# Patient Record
Sex: FEMALE | Race: WHITE
[De-identification: names, ages, dates, MRNs, and addresses within clinical notes are randomized per-mention and may not be internally consistent; named-entity substitution may affect disease eponyms.]

---

## 2017-03-14 ENCOUNTER — HOSPITAL ENCOUNTER (OUTPATIENT)
Age: 62
Discharge: HOME | End: 2017-03-14
Payer: MEDICAID

## 2017-03-14 DIAGNOSIS — F03.91: ICD-10-CM

## 2017-03-14 DIAGNOSIS — Z74.01: ICD-10-CM

## 2017-03-14 DIAGNOSIS — Z66: ICD-10-CM

## 2017-03-14 DIAGNOSIS — Z51.5: ICD-10-CM

## 2017-03-14 DIAGNOSIS — G35: Primary | ICD-10-CM

## 2017-08-15 ENCOUNTER — HOSPITAL ENCOUNTER (OUTPATIENT)
Dept: HOSPITAL 76 - PC | Age: 62
Discharge: HOME | End: 2017-08-15
Attending: NURSE PRACTITIONER
Payer: MEDICAID

## 2017-08-15 DIAGNOSIS — Z74.01: ICD-10-CM

## 2017-08-15 DIAGNOSIS — G35: ICD-10-CM

## 2017-08-15 DIAGNOSIS — F32.9: ICD-10-CM

## 2017-08-15 DIAGNOSIS — Z66: ICD-10-CM

## 2017-08-15 DIAGNOSIS — H91.90: ICD-10-CM

## 2017-08-15 DIAGNOSIS — F41.9: ICD-10-CM

## 2017-08-15 DIAGNOSIS — R53.1: ICD-10-CM

## 2017-08-15 DIAGNOSIS — K59.00: ICD-10-CM

## 2017-08-15 DIAGNOSIS — F03.91: ICD-10-CM

## 2017-08-15 DIAGNOSIS — R32: ICD-10-CM

## 2017-08-15 DIAGNOSIS — F22: ICD-10-CM

## 2017-08-15 DIAGNOSIS — Z51.5: Primary | ICD-10-CM

## 2017-08-15 DIAGNOSIS — H54.7: ICD-10-CM

## 2017-08-15 DIAGNOSIS — K02.9: ICD-10-CM

## 2017-08-15 PROCEDURE — 99348 HOME/RES VST EST LOW MDM 30: CPT

## 2017-08-15 NOTE — PROVIDER PROGRESS NOTE
Palliative Care Follow Up





- Referral


Referring Provider: Dr. Keyshawn Pace


Time of Visit: 0523-4327


Referral setting: Home (patient is seen in her home setting secondary to it is 

a taxing and considerable effort for her to leave the home related to her 

functional status, mostly bedbound and dementia with behavioral disturbances)


Referral Reason: MS





- Information Sources


History obtained from: Family (ex  Tico, caregiver)


Exam limitations: Clinical condition (patient with severe dementia and anxiety)





- History of Present Illness Update


Brief HPI Update: 


This is a 61 year old woman with multiple sclerosis, who has mostly been 

bedbound/chairbound the last two years. She is cared for at home by her ex-

 Tico. She has moderate to severe dementia with behavioral disturbances, 

for which I have been adjusting medications to manage her neuropsychiatric 

symptoms of agitation, anxiety, irritablility, perseverative behaviors, paranoia

, hallucinations and ongoing delusions. For the most part there has been 

improvement, when she is agitated she yells "ow ow ow", currently managed on 

medication regimen with supplemental lorazepam 0.5 mg for escalation about 

twice a day, and diazepam (after failed lorazepam) about twice a week. Her 

caregiver uses several behavioral approaches as first line appropriately. 


She has fluctuating days as far as physical status, if use diazepam, cannot 

assist with pivot transfers so makes caregiving more difficult. She has not had 

any skin issues or infections. She has very poor dentition, teeth are broken 

off at roots, which has her on soft/pureed diet. Patient has not dental 

insurance, and would not tolerate at trip to address, currently has not 

abscessed or caused more pain.








Social History





- Living Situation


Living arrangement: At home


Living Situation: With caregiver(s) (cared for soley by ex  Tico, who is 

also her paid [employer] worker. He himself has health limitations, is doing 

somewhat better but concern remains for long term plan if he declines)


Support System: 


no other support, intermittent family visits, patient unable to tolerate the 

"commotion" is able to have visitors for about 15 minutes, new grandbaby 

visited over the weekend








Medications/Allergies





- Medications


Home Medications: 


 Ambulatory Orders











 Medication  Instructions  Recorded  Confirmed


 


Buspirone HCl 10 mg PO BID 08/15/17 08/15/17


 


Diazepam 10 mg PO Q8HR PRN 08/16/17 08/16/17


 


Lorazepam 0.5 - 1 mg PO Q6HR PRN 08/16/17 08/16/17


 


Polyethylene Glycol 3350 [Miralax] 17 gm PO DAILY PRN 08/16/17 08/16/17


 


Quetiapine Fumarate [Seroquel] 100 mg PO BID 08/16/17 08/16/17


 


Sertraline HCl 100 mg PO DAILY 08/16/17 08/16/17














- Allergies


Allergies/Adverse Reactions: 


 Allergies











Allergy/AdvReac Type Severity Reaction Status Date / Time


 


No Known Drug Allergies Allergy   Verified 08/16/17 07:46














Review of Systems





- Constitutional


Constitutional: reports: Weakness, Other (remaining weight neutral per husbands 

perception)





- Eyes


Eyes: reports: Vision loss





- Ears, Nose & Throat


Ears, Nose & Throat: reports: Hearing loss, Dental decay (eating soft and 

pureed diet secondary to teeth decay)





- Cardiovascular


Cariovascular: denies: Chest pain





- Respiratory


Respiratory: denies: Cough, SOB at rest, SOB with exertion





- Gastrointestinal


Gastrointestinal: reports: Constipation (intermittent hard stools).  denies: 

Nausea, Reflux/heartburn





- Genitourinary


Genitourinary: reports: Incontinence (about 50% of time)





- Musculoskeletal


Musculoskeletal: reports: Stiffness, Muscle weakness (on good days; can 

transfer to the chair/wheelchair for meals/bathing; on bad days bedbound and 

not up as he cannot lift her anymore)





- Integumentary


Integumentary: reports: Hair changes (grown weigh out; knotted but clean)





- Neurological


Neurological: reports: General weakness, Memory problems





- Psychiatric


Psychiatric: reports: Depression, Anxiety, Delusions (patient can speak in full 

sentences, answers questions socially but inaccurate; reports current regimen 

for agitation/anxiety mostly helpul; still anxious but responds to lorazepam, 

using about 2 x a day first; if fails moves on to diazepam but this sedates her 

and then can't transfer. This happens about 2 x a week)





- Endocrine


Endocrine: reports: Intolerance to heat (sleeps with just sheet; likes it cool, 

fan is on)





- Hematologic/Lymphatic


Hematologic/Lymphatic: denies: Recurrent infections





- All Other Systems


All Other Systems: reports: Reviewed and negative





Physical Examination





- Vital Signs


Temperature: 97.4 C


Pulse Rate: 51


Respiratory Rate: 18


O2 Saturation: 95


Blood Pressure: 92/52





- Physical Exam


General Appearance: positive: No acute distress, Alert


Eyes Bilateral: positive: Other (some separation of lower  eyelid of left eye; 

not painful or red)


ENT: positive: Other (with decaying teeth, broken and soft nubs at gum line)


Neck: positive: Trachea midline.  negative: Lymphadenopathy (R), 

Lymphadenopathy (L)


Respiratory: positive: Breath sounds nml


Cardiovascular: positive: Regular rate & rhythm


Abdomen: positive: Nml bowel sounds, No distention


Skin: positive: No symptoms, Other (coccyx clear)


Extremities: positive: No pedal edema, Other (in bed; moves all extremeties)


Neurologic/Psychiatric: positive: Disoriented to place, Disoriented to time, 

Other (shy today, not as much eye contact; less "chatty" but cooperative)





Palliative Care





- POLST


Patient has POLST: Yes


POLST Status: DNR, Comfort Measures


Pain: Pain unchanged (patient when anxous yells "ow ow ow" but denies pain)


Drowsiness: None


Nausea: None


Anxiety: Moderate (4-6)


Dyspnea: None


Anorexia: None


Insomnia: Other (can escalate at nights at times, this usually indicates a few 

bad days with anxiety /agiation)


Constipation: Yes


Feelings of wellbeing/Perceived Quality of Life: No change (Tico feels patient 

qol better with balance of the medications; recognizes decline but not 

significant)


Performance Status: 


Pateint mostly bedbound; up in recliner to eat most of the time if able to 

participate in pivot transfer and weight bear, otherwise fed in bed. Total 

assistance with [employer] of bladder and bowel about 50% of time








- Palliative Care


Discussion: 


Surrogate decision maker [Tico does make most of the decisions regarding her care

; her DPOA though for medical decision making is her daughter Loli Zaragoza 

[phone] and Truman is if unable or unwilling as alternate agent]. Winsome 

unaware of her illness or seriousness of her situation. Tico perceives she is 

plateaued at this time, small declines both functionally and cogntively, but 

feel doing better with medications balanced. His health is such, feeling can 

stay caregiver for the time being, doing some better himself. Has "projects" 

that keep him busy and distracted. Patient seems content; loves to watch 

[employer] [facility] [employer] [facility] on the [place], does not offer any concerns or worries 

this visit.








Impression and Recommendations





- Palliative Care


Impression: 


This is a 61 year old woman who is mostly chair/bedbound as a result of her MS 

and dementia. Her mood is fairly stable, though still having breakthrough 

anxiety. Current care situation stable for now. 





Recommendations/Counseling Done: 


1. Multiple sclerosis, no significant further deterioration of physical status. 

Fluctuating ability to bear weight influenced by medications. 


2. Dementia with behavioral disturbances. Through  descriptions of 

management of anxiety and agitation, I believe it would be worth increasing the 

Buspar to 10 mg TID. Counseling on finding balance of sedation/anxiety and 

engagement with surroundings. Does appear from PRN medication use have made 

progress. Tico's perception is has made a difference in her management.


3. Advanced Care Planning. Patient without infections, weight loss, or 

increased symptom burden. Does remain at risk for decline but presenting on 

plateau today. Both satisfied with current quality of life, POLST in place, no 

change in care plan needed currently. Have done what is possible as far as 

putting together LTP if needed. Anticipatory guidance provided.





Time Spent: 


30 minutes with greater than 50% done in counseling regarding management of 

medications and anticipatory guidance.

## 2018-01-12 ENCOUNTER — HOSPITAL ENCOUNTER (OUTPATIENT)
Dept: HOSPITAL 76 - PC | Age: 63
Discharge: HOME | End: 2018-01-12
Attending: NURSE PRACTITIONER
Payer: MEDICAID

## 2018-01-12 DIAGNOSIS — Z66: ICD-10-CM

## 2018-01-12 DIAGNOSIS — M62.81: ICD-10-CM

## 2018-01-12 DIAGNOSIS — F41.9: ICD-10-CM

## 2018-01-12 DIAGNOSIS — F03.91: ICD-10-CM

## 2018-01-12 DIAGNOSIS — K59.00: ICD-10-CM

## 2018-01-12 DIAGNOSIS — G35: ICD-10-CM

## 2018-01-12 DIAGNOSIS — Z74.01: ICD-10-CM

## 2018-01-12 DIAGNOSIS — R53.83: ICD-10-CM

## 2018-01-12 DIAGNOSIS — Z51.5: Primary | ICD-10-CM

## 2018-01-12 DIAGNOSIS — Z99.3: ICD-10-CM

## 2018-01-12 PROCEDURE — 99349 HOME/RES VST EST MOD MDM 40: CPT

## 2018-01-12 NOTE — CONSULTATION NOTE
Palliative Care Follow Up





- Referral


Referring Provider: Isis BRITT


Time of Visit: 5025-0610


Referral setting: Home (Patient is seen in her home setting secondary is 

considerable and taxing effort for the patient leave her home, she is 

essentially bedbound able to transfer only on her good days.  She is also 

limited by her severe dementia.)


Referral Reason: MS/ Dementia with behavioral disturbances





- Information Sources


Records reviewed: Previous records reviewed


History/Review of Systems obtained from: Family (Tico her ex-, is her 

[employer] worker and has provided most of the information.)


Exam limitations: Clinical condition (Patient with limited ability to 

participate in exam, she can answer yes/no questions though she does 

confabulate quite a bit.  She has no recall are any grounding in reality.)





- History of Present Illness Update


Brief HPI Update: 


This is a 62-year-old woman with multiple sclerosis, who is mostly been bedbound

/chair bound for the last 2 years.  She has had functional decline and appears 

to have had some weight loss, but does have underlying dementia with behavioral 

disturbances.  Her neuropsychiatric symptoms include agitation anxiety 

irritability, perseverative behaviors, paranoia, hallucinations, and ongoing 

delusions.  She is no longer responding to intermittent Lorazepam, but does 

respond to the diazepam when she gets as severely agitated.  They are needing 

to use about 1-1 and half tabs a day again fluctuating status regarding this.  

She can go for long periods of time without any need for breakthrough 

medication For her behaviors, thus the medicines to titrate her antipsychotics 

any further.  


She has not had any skin breakdown, or infections, she has very poor dentition 

with teeth are broken at the roots and this is on a soft pured diet.  At this 

point in time she not be able to tolerate a trip to have her teeth evaluated.  

No history been able to leave the home for doctor's appointments.  Thus 

palliative care has been providing home visits to check on patient, address 

's questions, and adjust medications accordingly.








Social History





- Living Situation


Living arrangement: At home


Living Situation: With caregiver(s)


Support System: 


She does have children, the rarely visit, she has enjoyed it seen her new 

grandbaby over the holidays.  Otherwise they are quite isolated.








Medications/Allergies





- Medications


Home Medications: 


 Ambulatory Orders











 Medication  Instructions  Recorded  Confirmed


 


Buspirone HCl 10 mg PO TID 08/15/17 01/14/18


 


Polyethylene Glycol 3350 [Miralax] 17 gm PO DAILY PRN 08/16/17 01/14/18


 


Quetiapine Fumarate [Seroquel] 100 mg PO BID 08/16/17 01/14/18


 


Sertraline HCl 100 mg PO DAILY 08/16/17 01/14/18


 


diazePAM [Diazepam] 5 - 10 mg PO Q8HR PRN 08/16/17 01/14/18














- Allergies


Allergies/Adverse Reactions: 


 Allergies











Allergy/AdvReac Type Severity Reaction Status Date / Time


 


No Known Drug Allergies Allergy   Verified 08/16/17 07:46














Review of Systems





- Constitutional


Constitutional: reports: Fatigue, Weight loss (Unable to quantify just appears 

somewhat thinner, has been his concurrence with this.  Her diet has become more 

challenging with only been able to take soft or pured foods.  Does feel like 

he keeps a well-hydrated)





- Eyes


Eyes: reports: Vision loss (Unable to quantify changes but patient reports it 

is worse)





- Ears, Nose & Throat


Ears, Nose & Throat: reports: Dental decay.  denies: Dental pain





- Gastrointestinal


Gastrointestinal: reports: Constipation (intermittent), Good appetite





- Genitourinary


Genitourinary: reports: Incontinence





- Musculoskeletal


Musculoskeletal: reports: Stiffness, Muscle weakness (Fluctuating days as far 

as patient able to participate in pivot transfers.  With has-beens increasing 

difficulty with his own health patient needs to be able to weight-bear he 

cannot hold her up.  If he finds that she is too weak he does not try to 

attempt transfer.)





- Integumentary


Integumentary: reports: Dryness





- Neurological


Neurological: reports: General weakness, Memory problems (Patient seems to be 

having more trouble tracking conversation, less full sentences, no initiation 

of questions.  She does not present with word salad but definitely with word 

finding difficulty.)





- Psychiatric


Psychiatric: reports: Anxiety, Delusions, Hallucinations, Aggitation, Behavior 

disturbances (Has been reports "spells".  These can be escalated for unknown 

reason since, he does try and figure out just like "a toddler" whether it is 

pain, temperature, needing to go to the bathroom, or hunger.  Sometimes she 

just gets herself escalating and he uses the diazepam with success.)





- Endocrine


Endocrine: reports: Intolerance to heat (Patient likes a temperature in the 

room quite cool)





- Hematologic/Lymphatic


Hematologic/Lymphatic: denies: Recurrent infections





- All Other Systems


All Other Systems: reports: Reviewed and negative





Physical Exam





- Vital Signs


Temperature: 97.8 C


Pulse Rate: 76


Respiratory Rate: 18


O2 Saturation: 96 (ra @ rest)


Blood Pressure: 102/62





- Physical Exam


General Appearance: positive: No acute distress, Anxious


Eyes Bilateral: positive: Normal inspection


ENT: positive: Other (gums with swelling; patient picks and pulls at soft 

decaying teeth; no abscesses noted; patient denies pain on exam; does appear 

worsening)


Neck: positive: Trachea midline.  negative: Lymphadenopathy (R), 

Lymphadenopathy (L)


Cardiovascular: positive: Regular rate & rhythm


Respiratory: positive: Breath sounds nml


Abdomen: positive: Non-tender, Soft, Nml bowel sounds


Skin: positive: Pallor, Dryness.  negative: Pressure wound


Extremities: positive: No pedal edema, Other (Patient bedbound, was able to 

move something that those far as bed mobility, no signs of difficulty in moving 

any of her extremities.)


Neurologic/Psychiatric: positive: Disoriented to place, Disoriented to time, 

Weakness, Flat affect





Palliative Care





- POLST


Patient has POLST: Yes


POLST Status: DNR, Comfort Measures


Pain: Comment (She does not appear to have pain though when asked reports her 

back hurts, but she does have good bed mobility and her caregiver reports she 

does not report this on a consistent basisShe denies any sharp shooting pains, 

any numbness or tingling in her extremities,)


Constipation: Yes, Unmanaged (Had been trying intermittent bowel meds, with 

"blow outs" attempting to find a good bowel program.  Suspect has something to 

do with her diet and decrease fiber as well as bedbound), Intermittent 

constipation


Performance Status: 


Patient is bedbound for the most part, on good days can give it to wheelchair 

or rolling walker, to get to the bathroom.  They do try and get her there for 

bowel movements if possible otherwise she is very combative in changing of her 

diapers at times.  He reports this is gotten easier over time, as she is gotten 

less strength, and it depends on the day it is not consistent.  I would put her 

at a PPS of 40%








- Palliative Care


Discussion: 


Discussed at length concern regarding patient's current situation.  There is 

surrogate decision maker is her daughter Loli Zaragoza [phone].  Truman 

is available as an alternative agent.  Patient herself has very little insight 

into the seriousness of her illness or her situation.  He currently is looking 

at his health, and wondering at this point how much longer he can support 

[place].  He is expecting a transition in the next few months, he will be in 

touch with his  for the care for her.  Patient had a difficult last 

24 hours as the Internet went down, she was somewhat distressed As for the most 

part she spends her time watching the "[employer]'s another easy-going shows.  She 

gets quite bored, Tico does try to talk her down when she is in these episodes, 

but it has been a difficult road for them both.








Results





- Lab Results


Lab and Imaging Results: 


Into 2 to draw labs, patient has very thin veins blue twice one in the left 

antecubital one on the right hand.  Tico also reported she is probably less than 

hydrated given her difficult time the last 24 hours, the patient is unable to 

get out for labs.  These were just going to be done in the context they have 

not been done for a while and she is on multiple medications.








Impression and Recommendations





- Palliative Care


Impression: 


This is a 62-year-old woman with multiple sclerosis, and dementia with 

behavioral disturbances.  She does show progression of her dementia, not so 

much her symptoms of MS other though she is much weaker, she is having less 

time that she is able to pivot transfer, he does not have signs or symptoms of 

neurogenic bladder or bowel.  She is incontinent mostly with urine, 

intermittently with bowel.  This is progression.  She is going to be need to be 

placed in the next 2-3 months, Tico has tried previously to do some of this foot 

work, he is going to have to defer to her caseworkers.





Recommendations/Counseling Done: 


1.  Multiple sclerosis, no significant further deterioration of her physical 

status, fluctuating ability to bear weight.Patient has hospital bed and 

appropriate equipment at this point in time, no further intervention needed





2.  Dementia with behavioral disturbances.  Patient on Seroquel 100 mg twice 

daily, BuSpar 10 mg 3 times daily, and her sertraline 100 mg daily.  We did 

review her current behaviors, weighing benefits and burdens of further titration

, has been intermittent and not sustained and managed currently with as needed 

dosing of diazepam which can be 1-2 doses a day or none for several days.  

Given there is no persistence of these symptoms and the diazepam is working for 

breakthrough anxiety and behaviors will leave it at this point in time.





Advanced care planning patient's been without infections she does present today 

with small but perceivable weight loss in appearance, no change in her symptom 

burden.  She of course does remain at risk for decline but currently presents 

is stable.  Unfortunately unable to draw her labs today.  She is actually quite 

cooperative to this process.  She does have a [facility] [address] in place as well as her 

daughter as DPO A, unfortunately Tico will have to default to the [facility] case 

manager to find placement at the point in time he feels he can no longer care 

for her which she suspect is in the next few months.  This is attributed to his 

own health problems and no longer able to meeting her needs.  He had one a 

place for close to where family could visit, at this point in time he will 

defer to what ever is available.





In looking at the [place] GNE index which looks a population of community dwelling 

adults age 65 years older and the outcome of all causes 1 year mortality.  As a 

risk calculator cannot predict the future for any one individual.  Risk 

calculator is given an estimate of how many people with similar risk factors 

will live and die that cannot identify who will live and he will die her score 

is a 4 which gives her risk of 1 year mortality of 14.6%





Time Spent: 


85 minutes with greater than 50% of this done in counseling and anticipatory 

guidance, weighing benefits and burdens of titrating meds at this point in time 

will make no changes, attempted blood draw 2 will attempt her next visit.  Tico 

will contact me if further concerns are she is placed to allow palliative care 

[place]

## 2018-04-17 ENCOUNTER — HOSPITAL ENCOUNTER (OUTPATIENT)
Dept: HOSPITAL 76 - PC | Age: 63
Discharge: HOME | End: 2018-04-17
Attending: NURSE PRACTITIONER
Payer: MEDICAID

## 2018-04-17 DIAGNOSIS — F03.91: ICD-10-CM

## 2018-04-17 DIAGNOSIS — G35: ICD-10-CM

## 2018-04-17 DIAGNOSIS — Z74.01: ICD-10-CM

## 2018-04-17 DIAGNOSIS — F41.9: ICD-10-CM

## 2018-04-17 DIAGNOSIS — Z66: ICD-10-CM

## 2018-04-17 DIAGNOSIS — R53.1: ICD-10-CM

## 2018-04-17 DIAGNOSIS — Z51.5: Primary | ICD-10-CM

## 2018-04-17 PROCEDURE — 99349 HOME/RES VST EST MOD MDM 40: CPT

## 2018-04-17 NOTE — CONSULTATION NOTE
Palliative Care Follow Up





- Referral


Referring Provider: Isis BRITT


Time of Visit: 9470-8283


Referral setting: Home (It is a taxing and considerable effort for the patient 

to leave the home, she is mostly bedbound and would need ambulance transfer)


Referral Reason: Multiple Sclerosis





- Information Sources


Records reviewed: Previous records reviewed


History/Review of Systems obtained from: Family (caregiver Tico providing ROS)


Exam limitations: Clinical condition (patient with short term memory issues; 

confabulation and delusions; able to answer yes and no; but answers unreliable)





- History of Present Illness Update


Brief HPI Update: 


This is a 62-year-old woman with multiple sclerosis, who has been mostly 

bedbound/chair bound for the last 2 years.  She continues to have functional 

decline, she spends most of her day in bed, he does transfer her when she is 

able to bear weight and pivot, about 3 times a day to the arm chair next to her 

bed.  He only occasionally uses the wheelchair, she has no trunk strength, does 

support her with sheet to transfer in bathroom for bowel movements. Long 

discussion regarding considering transitioning to another setting, particularly 

[facility]. 


He does understand patient at this point would not be able to be restrained in 

any way, including bed rails. She does have rail on back side of bed but uses 

that only for support when sitting up, has not fallen out of bed with left side 

open. He reports he does pivot her when she is feeling better, to the chair, 

but patient's legs often give out, and given his weakened back and inability to 

lift her, she has slid to the floor, as well as he is needed to support her 

upper torso.  She is only able to tolerate being up in the chair about 15 

minutes related to fatigue. She does have some scabs on her right knee, and 

bruising on her left thigh which are fading.  She also has some bruising on her 

left upper arm as a result of these episodes where her legs give out.  He 

reports she has not had any falls from impulsivity, nor has she tried to get 

out of bed, or out of the chair.


Patient presents as much weaker, today she does have some upper extremity 

tremors, she does have upper and lower extremity muscle wasting. She does eat, 

has not had any choking, but given her poor dentition needs soft foods.





Patient has always been cordial, interacted without any difficulty with exam, 

have been seen her since 2/16/2016.  She does present with confabulation, 

delusions as far as her son dying recently, this was a event in the distant past

, and very poor short-term memory issues.  Her  reports she still has 

anxiety attacks, these manifest as patient gets anxious, twisted her hair, and 

with curling up in the bed.  He has been using the diazepam 10 mg with good 

results, she does have episodic moaning with this as well which responds to the 

diazepam , usually needs 1-2 a day, some days does not need some at all. 


Caregivers health, continues to deteriorate, he is no longer able to support 

her weight related to his back pain.  He is also come to a crucial point as his 

lease will be up, he has been in touch with her  Anali as well 

as her director Candy.  [facility] had met 

with Jeanine, and understanding this point in time is this is not an option 

given her fall risk and previous behaviors documented. It was proposed to 

consider trial respite stay, unable to confirm care needs other than reported 

as my interactions have all been without problems.











Social History





- Living Situation


Living arrangement: At home


Living Situation: With caregiver(s)


Support System: 


Tico, patient's ex-, has been her caregiver for the last 5 years.  He is 

no longer able to physically do this, as well as feels like he needs to move 

on. Her children are not in a situation to be able to take on her caregiving 

needs, her daughter Loli is her DPOAE.  There is much concern about 

patient is a vulnerable adult, and seem to be homeless.








Medications/Allergies





- Medications


Home Medications: 


 Ambulatory Orders











 Medication  Instructions  Recorded  Confirmed


 


Buspirone HCl 10 mg PO TID 08/15/17 04/17/18


 


Quetiapine Fumarate [Seroquel] 100 mg PO BID 08/16/17 04/17/18


 


Sertraline HCl 100 mg PO ACHS 08/16/17 04/17/18


 


diazePAM [Diazepam] 5 - 10 mg PO Q8HR PRN 08/16/17 04/17/18


 


Cholecalciferol (Vitamin D3) 2,000 units PO DAILY 04/17/18 04/17/18





[Vitamin D3]   


 


Sennosides [Senna Lax] 8.6 mg PO DAILY PRN 04/17/18 04/17/18














- Allergies


Allergies/Adverse Reactions: 


 Allergies











Allergy/AdvReac Type Severity Reaction Status Date / Time


 


No Known Drug Allergies Allergy   Verified 08/16/17 07:46














Review of Systems





- Constitutional


Constitutional: reports: Weakness, Weight loss (Tico feel minimal baseline wt. 

115)





- Eyes


Eyes: reports: Vision loss





- Ears, Nose & Throat


Ears, Nose & Throat: reports: Hearing loss (mild), Dental decay (teeth rotting 

at gumline; no abcesses noted; denies pain)





- Respiratory


Respiratory: reports: Cough (in evenings not related to eating)





- Genitourinary


Genitourinary: reports: Incontinence





- Musculoskeletal


Musculoskeletal: reports: Transfer issues (patient often lexi with pivot 

transfer if fatgued; has gaby if goes to floor)





- Integumentary


Integumentary: reports: Dryness





- Neurological


Neurological: reports: General weakness, Memory problems, Other (tremors)





- Psychiatric


Psychiatric: reports: Anxiety





- Endocrine


Endocrine: reports: Intolerance to heat





- Hematologic/Lymphatic


Hematologic/Lymphatic: denies: Recurrent infections





- All Other Systems


All Other Systems: reports: Reviewed and negative





Physical Exam





- Vital Signs


Temperature: 96.8 C


Pulse Rate: 78


Respiratory Rate: 18


O2 Saturation: 93 (ra @ rest)


Blood Pressure: 122/72





- Physical Exam


General Appearance: positive: No acute distress, Alert


Eyes Bilateral: positive: Normal inspection


ENT: positive: No signs of dehydration


Neck: positive: Trachea midline.  negative: Lymphadenopathy (R), 

Lymphadenopathy (L)


Cardiovascular: positive: Regular rate & rhythm


Respiratory: positive: Breath sounds nml


Abdomen: positive: Non-tender, Soft, Nml bowel sounds


Skin: positive: Dryness, Bruising.  negative: Pressure wound


Extremities: positive: No pedal edema, Other (muscle wasting upper and lower 

extremity)


Neurologic/Psychiatric: positive: Disoriented to person, Disoriented to place, 

Disoriented to time, Weakness, Depressed mood/affect, Flat affect





Palliative Care





- POLST


Patient has POLST: Yes


POLST Status: DNR, Comfort Measures


Pain: No pain


Performance Status: 


Patient dependent for all the ADLs, fluctuating status for pivot transfers, 

fluctuating status as far as able to self-feed.  Patient is mostly bedbound, up 

a few times for meals in recliner.








- Palliative Care


Discussion: 


Discussed at length again patient's current situation with [facility] caregiver and 

ex- Tico.  He is quite frustrated as he has felt like he has done due 

diligence in trying to locate a new setting for her.  He has been in touch with 

his  Anali, is unclear how much assistance he is going to be able 

to get from them.





Patient denies any worries, when asked started talking about her son who passed 

away, she was easily redirected to focus on her new grandson Mike.  She does 

live somewhat a limited life, she does enjoy watching the [employer], I suspect 

any kind of change or transition will take some time for her to adjust to.  She 

also though may thrive with increased stimulation and interaction with other 

caregivers.





Goals continue to be on a focus of comfort, recognizing her quality of life is 

limited, she has had ongoing slow decline, but no acute infections are 

escalation in symptoms.








Impression and Recommendations





- Palliative Care


Impression: 


This is a 62-year-old woman with multiple sclerosis, and dementia with 

behavioral disturbances.  She continues to show progression of her dementia, 

her behaviors have improved as far as agitation, though she still continues to 

have significant anxiety.  She has had functional decline, she is incontinent 

mostly of urine, intermittently with bowel.  It is more urgent now as far as 

placement need, palliative care will only be able to follow if patient stays [place] 

[place].





Recommendations/Counseling Done: 


1. Multiple sclerosis.  Patient has had continued functional decline.  She has 

fluctuating status as well as increased tremors in her upper extremities.  She 

denies any pain or discomfort.  She is mostly bedbound, limited bed mobility.


2.  Dementia with behavioral disturbances.  Patient on Seroquel 100 mg twice 

daily, BuSpar 10 mg 3 times a day, and her sertraline 100 mg at bedtime.  She 

has intermittent need for diazepam to manage her anxiety 1-2 doses a day, does 

have room for titration of medications if needed for adjustment to institution.

  At this point in time agreement was to leave things as is, as she has been 

doing fairly well on her current dosing.


3.  Advanced care planning.  Patient does have a SHASHANK ST in place, her daughter 

Loli Zaragoza [phone] is her durable power of health .  Tico has 

been the one working on finding placement, they have until the end of April, 

there is been conversation and discussion about possible respite stay as an 

interim measure.  Tico's understanding is he is also to pursue other avenues, 

though has not been able to make progress with this in the past despite 

diligence. 





Time Spent: 


50 minutes with greater than 50% of this done in counseling and anticipatory 

guidance, evaluating patient's current status, coordination of care regarding 

, and pending placement.

## 2018-07-19 ENCOUNTER — HOSPITAL ENCOUNTER (OUTPATIENT)
Dept: HOSPITAL 76 - PC | Age: 63
Discharge: HOME | End: 2018-07-19
Attending: NURSE PRACTITIONER
Payer: MEDICAID

## 2018-07-19 DIAGNOSIS — G35: ICD-10-CM

## 2018-07-19 DIAGNOSIS — Z51.5: Primary | ICD-10-CM

## 2018-07-19 DIAGNOSIS — F03.91: ICD-10-CM

## 2018-07-19 DIAGNOSIS — K59.00: ICD-10-CM

## 2018-07-19 DIAGNOSIS — F41.9: ICD-10-CM

## 2018-07-19 DIAGNOSIS — Z66: ICD-10-CM

## 2018-07-19 DIAGNOSIS — Z74.01: ICD-10-CM

## 2018-07-19 PROCEDURE — 99348 HOME/RES VST EST LOW MDM 30: CPT

## 2018-07-19 NOTE — CONSULTATION NOTE
Palliative Care Follow Up





- Referral


Referring Provider: Isis BRITT


Time of Visit: 2940-9517


Referral setting: Home (It is a taxing and considerable effort for the patient 

to leave the home secondary to bedbound status)


Referral Reason: MS/Dementia with behavioral disturbances





- Information Sources


Records reviewed: Previous records reviewed


History/Review of Systems obtained from: Family ( provides the history)


Exam limitations: Clinical condition (patient with dementia)





- History of Present Illness Update


Brief HPI Update: 


This is a 62-year-old woman with multiple sclerosis, who is mostly bedbound, 

Cared for by her ex- who has significant health problems himself. On 

good days he is able to transfer her to the rolling walker to ease toileting, 

but patient with fluctuating weakness and cognition, and legs will give out. 

During these times he does easier to the floor, and uses a Lucian transfer to 

get her back in bed.  She does have significant fatigue, she does have some 

upper and lower extremity muscle wasting.  She does have continued subtle 

changes of decline, she is less able to feed herself and needing increased 

assistance, more difficulty with bed mobility, and with cognitive decline 

needing more cueing and less able to help participate in her care.





She also has underlying dementia with behavioral disturbances, because she is 

weaker is her easier to handle particularly around.  Care and toileting.  She 

does have intermittent anxiety, with escalation often needs diazepam, these are 

manifested by twisting her hair crying out and some delusions.  He has cut her 

hair which has helped as far as some of their interactions, as she was not 

allowing for him to wash it or brushing on a regular basis and it was getting 

quite long.





In spring, they were going to lose at least, Tico had made over 100 calls to 

various different facilities, he has put her name on list.  Had found no place 

to be able to transition her to.  This was a huge anxiety for them both, his 

landlord has allowed him to stay, but this has not addressed the issue of Tico's 

ongoing decline in health, reports they are "managing day-to-day".








Social History





- Living Situation


Living arrangement: At home


Living Situation: With caregiver(s) (patient cared for by ex-; extension 

of lease given; caregiver's health still of concern; Has been her caregiver for 

over 5 years,)





Medications/Allergies





- Medications


Home Medications: 


 Ambulatory Orders











 Medication  Instructions  Recorded  Confirmed


 


Buspirone HCl 10 mg PO TID 08/15/17 07/19/18


 


Quetiapine Fumarate [Seroquel] 100 mg PO BID 08/16/17 07/19/18


 


Sertraline HCl 100 mg PO ACHS 08/16/17 07/19/18


 


diazePAM [Diazepam] 5 - 10 mg PO Q8HR PRN 08/16/17 07/19/18


 


Cholecalciferol (Vitamin D3) 2,000 units PO DAILY 04/17/18 07/19/18





[Vitamin D3]   


 


Docusate Sodium [Dulcolax Stool 100 mg PO PRN PRN 07/19/18 07/19/18





Softener]   














- Allergies


Allergies/Adverse Reactions: 


 Allergies











Allergy/AdvReac Type Severity Reaction Status Date / Time


 


No Known Drug Allergies Allergy   Verified 08/16/17 07:46














Review of Systems





- Constitutional


Constitutional: reports: Weight loss (appears thinner; no wts)





- Ears, Nose & Throat


Ears, Nose & Throat: reports: Dental decay (teeth breaking off at gum line; no s

/s abcess/infection currently;  has not observed any pain at this time; 

does not allow oral care)





- Cardiovascular


Cardiovascular: denies: Edema





- Respiratory


Respiratory: denies: Cough, SOB at rest





- Gastrointestinal


Gastrointestinal: reports: Constipation (intermittent responsive to ARLETTE), 

Other (fluctuating appetite; usually eats a good breakfast; soft foods)





- Genitourinary


Genitourinary: reports: Incontinence





- Musculoskeletal


Musculoskeletal: reports: Stiffness, Muscle weakness, Transfer issues (

flucuating weakness; legs give out then needs to use lucian lift-about two times 

a week)





- Integumentary


Integumentary: reports: Hair changes (cut hair because patient not allow it to 

be brushed;)





- Neurological


Neurological: reports: General weakness, Memory problems (patient answers yes/ 

no questions unable to remember answers)





- Psychiatric


Psychiatric: reports: Behavior disturbances (mood fluctuates through day; often 

awake through night)





- Endocrine


Endocrine: reports: Intolerance to heat





- Hematologic/Lymphatic


Hematologic/Lymphatic: denies: Recurrent infections





- All Other Systems


All Other Systems: reports: Reviewed and negative





Physical Exam





- Vital Signs


Temperature: 96.4 C


Pulse Rate: 91


Respiratory Rate: 18


O2 Saturation: 94 (ra @ rest)


Blood Pressure: 132/68





- Physical Exam


General Appearance: positive: No acute distress


Eyes Bilateral: positive: Normal inspection


ENT: positive: Other (broken teeth along gum line; no s/s infection at this time

)


Neck: positive: No JVD, Trachea midline


Cardiovascular: positive: Regular rate & rhythm


Respiratory: positive: Breath sounds nml


Abdomen: positive: Non-tender, Soft, Nml bowel sounds


Skin: positive: Pallor.  negative: Pressure wound


Extremities: positive: No pedal edema, Other (bedbound for exam; weak but can 

move all extremities on command; difficulty with bed mobilty assisted to turn)


Neurologic/Psychiatric: positive: Disoriented to place, Disoriented to time, 

Weakness





Palliative Care





- POLST


Patient has POLST: Yes


POLST Status: DNR, Comfort Measures


Pain: Location (Patient denies pain, Tico reports does seem to be uncomfortable 

at times i.e. if has constipation; difficulty communicating her distress)


Sleep: Variable sleep pattern (Patient tends to be very wakeful late into the 

night, then sleeps quite a bit during the day.  Does appear to be sleeping 

somewhat more, she does like to watch TV her favorite show is the [employer].  He 

does not put anything disturbing on the television for her.)


Performance Status: 


Patient dependent on caregiver for all ADLs and IADLs.  He tries to read her 

fluctuating physical and functional status, to avoid risk of falls, it is 

easier to toilet her though in the bathroom, as far as changing in.  Care as 

she can be less than cooperative in the bed and it is difficult for him to 

change her with his back.  He reports and abuse in the Lucian about twice a week

, he does know he can call for lift assist.  She has not had any acute injuries

, does get some bruising occasionally on her knees and her transfers








- Palliative Care


Discussion: 


Patient is very childlike in her appearance, is interactive with myself.  No 

meaningful conversation or initiation of questions, unclear if her answers are 

correct though she is able to make eye contact and answer easy yes/no 

questions.  She does follow some directions, but is quite limited as she is 

mostly bedbound.  Caregiver does perceive a limited quality of life, but feels 

transitioning to another setting most likely would be even more distressful for 

her.  There son is moving to [place], is unclear if is going to be more 

help.  In discussing whether to use some of his [facility] hours for respite, they 

are dependent on the finances to be able to stay there so this is a limited 

option.  It is a difficult situation for both of them, Tico continues to try and 

take it just day by day.








Impression and Recommendations





- Palliative Care


Impression: 


This is a 62-year-old woman with progressive dementia with behavioral 

disturbances, currently medicated by her current medication regimen.  She also 

has underlying multiple sclerosis, with decreasing functional decline.  Her 

current living situation is on hold, remains very tenuous as is dependent on 

her caregivers health.  Palliative care available to provide support as issues 

arise, patient is bedbound and unable to get out of the house for appointments





Recommendations/Counseling Done: 


1. Dementia with behavioral disturbances.  Patient currently on Seroquel 100 mg 

twice a day, BuSpar 10 mg 3 times daily, and sertraline 100 mg at bedtime.  She 

has intermittent need for diazepam to manage her anxiety, there has been 

decreased use with this but averages about once a day.  There is room for 

titration, but feels current balance is good of sedation and neuropsychiatric 

behaviors.


2.  Multiple sclerosis.  Patient continues to have ongoing subtle decline, she 

does have fluctuating both cognitive and physical status, putting her at risk 

for falls.  Her care needs are becoming more complex, she is also needing 

assistance with feeding now.  She is incontinent of bowel and bladder.  She has 

not had any infections.





3. Advanced care planning.  Patient does have a SHASHANK ST in place, do not attempt 

resuscitation and comfort measures.  Her daughter Loli Zaragoza [phone] 

is her DURABLE POWER OF , but is minimally involved.  Tico her ex-

 is her [facility] worker, and provides oversight in her care.  Situation 

remains tenuous as far as no identified placement options, his commitment is to 

continue to provide support as long as he is physically able.





Time Spent: 


30 minutes with greater than 50% of this done in counseling, review of her 

medications and behaviors, and anticipatory guidance.

## 2018-11-29 ENCOUNTER — HOSPITAL ENCOUNTER (OUTPATIENT)
Dept: HOSPITAL 76 - PC | Age: 63
Discharge: HOME | End: 2018-11-29
Attending: NURSE PRACTITIONER
Payer: MEDICAID

## 2018-11-29 DIAGNOSIS — Z66: ICD-10-CM

## 2018-11-29 DIAGNOSIS — G35: ICD-10-CM

## 2018-11-29 DIAGNOSIS — R13.10: ICD-10-CM

## 2018-11-29 DIAGNOSIS — Z51.5: Primary | ICD-10-CM

## 2018-11-29 DIAGNOSIS — K59.00: ICD-10-CM

## 2018-11-29 DIAGNOSIS — F05: ICD-10-CM

## 2018-11-29 DIAGNOSIS — Z74.01: ICD-10-CM

## 2018-11-29 DIAGNOSIS — F03.91: ICD-10-CM

## 2018-11-29 PROCEDURE — 99349 HOME/RES VST EST MOD MDM 40: CPT

## 2018-11-29 NOTE — CONSULTATION NOTE
Palliative Care Follow Up





- Referral


Referring Provider: Isis BRITT


Time of Visit: [id number]


Referral setting: Home (Patient is currently bedbound, will be a taxing and 

considerable effort for the patient leave the home would need ambulance 

transfer.)


Referral Reason: MS/Dementia/Dysphagia





- Information Sources


Records reviewed: Previous records reviewed


History/Review of Systems obtained from: Family (ebonie COHN caregiver, 

provides most of history; patient with some conversational sentences; but does 

not initiate information or provide accurate ROS)


Exam limitations: Clinical condition (moderate dementia)





- History of Present Illness Update


Brief HPI Update: 


This is a 63-year-old woman I have been caring for on palliative care since 

2016.  She has advanced multiple sclerosis, who has had ongoing steady 

functional and cognitive decline.  She was diagnosed originally in  with her

MS, with symptoms related to visual changes, and lower extremity weakness.  She 

was originally able to ambulate a few steps, toilet, but has had ongoing decline

to pivot transfers, now to bedbound status.  She has had upper and lower 

extremity muscle wasting, does have some spasticity, intermittent discomfort.  

Her most recent presenting symptoms, which is why he has been called for me to 

visit, she has developed progressive dysphagia over the last several weeks to 

months.  She currently is on a modified soft diet, though had 2 significant 

choking episodes last week which frightened her caregiver.  She is able to drink

thin fluids, does have some food pocketing, and unable to manage boluses of food

and less is cut up quite tiny.  Her cognitive status has on admit couple years 

ago was fairly significant behavioral symptoms, lashing out, screaming 

profanities, severe anxiety and agitation.  Have titrated her meds to appears to

a stable state.  She has needed less diazepam for her outbursts, though still 

now patterns as an sundowning 25 and 8 in the evening.  He reports she no longer

screams and cries out but does still contort and seems in some kind of emotional

distress, this does respond to the diazepam.  Patient is easily fatigued, just 

through our exam she became quite tired.  No signs or symptoms of 

breathlessness, no signs or symptoms of UTI.  He reports she is sleeping 12-14 

hours a day.  Patient's most, and distraction is she does like to watch the 

[employer] on TV.  Caregiver is now having to feed her and provide much more hands

on care.








Social History





- Living Situation


Living arrangement: At home


Living Situation: With caregiver(s) (Patient has been cared for by her ex-

 Tico for greater than 5 years, he is paid as her erickson worker.  Her 

daughter Loli Zaragoza has both financial and medical durable power of health

 though Tico does make the day-to-day decisions regarding her care.  She 

does see her children from time to time, she does have 6 children, one who has 

since passed.  [place].  Patient was 

to be evicted from mobile home, X has been Tico does help health issues himself, 

unable to find placement for her but received a reprieve from the landlord.  Tico

is committed to caring for as long as he can physically, with her bedbound 

status and decreased behaviors her care has become easier.)





Medications/Allergies





- Medications


Home Medications: 


                                Ambulatory Orders











 Medication  Instructions  Recorded  Confirmed


 


Buspirone HCl 10 mg PO TID 08/15/17 11/29/18


 


Quetiapine Fumarate [Seroquel] 100 mg PO BID 17


 


Sertraline HCl 100 mg PO ACHS 17


 


diazePAM [Diazepam] 5 - 10 mg PO Q8HR PRN 17


 


Cholecalciferol (Vitamin D3) 2,000 units PO DAILY 18





[Vitamin D3]   


 


Docusate Sodium [Dulcolax Stool 100 mg PO PRN PRN 18





Softener]   














- Allergies


Allergies/Adverse Reactions: 


                                    Allergies











Allergy/AdvReac Type Severity Reaction Status Date / Time


 


No Known Drug Allergies Allergy   Verified 17 07:46














Review of Systems





- Constitutional


Constitutional: reports: Fatigue, Weight loss (pateint eating less, needing to 

have food cut in small pieces; reportedly gets 36 oz fluid a day).  denies: 

Fever





- Ears, Nose & Throat


Ears, Nose & Throat: reports: Dental decay, Other (gums with swelling)





- Respiratory


Respiratory: reports: Cough (when eating).  denies: SOB at rest





- Gastrointestinal


Gastrointestinal: reports: Constipation (bowels move about every 6-7 days), 

Other (increased diffiuclty with eating; dysphagia see HPI).  denies: Nausea





- Genitourinary


Genitourinary: reports: Incontinence





- Musculoskeletal


Musculoskeletal: reports: Stiffness, Muscle weakness, Transfer issues (uses 

lucian to lift to change bed), Other (bedbound)





- Integumentary


Integumentary: reports: Dryness





- Neurological


Neurological: reports: General weakness, Memory problems





- Psychiatric


Psychiatric: reports: Depression, Anxiety, Delusions, Hallucinations, Behavior 

disturbances (decreasing)





- Hematologic/Lymphatic


Hematologic/Lymphatic: denies: Recurrent infections





- All Other Systems


All Other Systems: reports: Reviewed and negative





Physical Exam





- Vital Signs


Temperature: 97.3 C


Pulse Rate: 83


Respiratory Rate: 18


O2 Saturation: 91 (ra @ rest)


Blood Pressure: 92/54 (faint to hear)





- Physical Exam


General Appearance: positive: No acute distress, Other (appears very tired; has 

not been recently medicated)


Eyes Bilateral: positive: Normal inspection


ENT: positive: Other (gums swollen; teeth broken at gumline)


Neck: positive: Trachea midline


Cardiovascular: positive: Regular rate & rhythm


Respiratory: positive: Diminished in bases.  negative: Wheezes, Rales, Rhonchi


Abdomen: positive: Non-tender, Soft


Skin: positive: Pallor, Dryness.  negative: Pressure wound


Extremities: positive: No pedal edema, Other (Patient very stiff and difficult 

to straighten extremities, tends to pull up, unclear if resistant, related 

disease, or behaviors.  Patient has been bedbound for several months now)


Neurologic/Psychiatric: positive: Disoriented to place, Disoriented to time, 

Weakness, Flat affect





Palliative Care





- POLST


Patient has POLST: Yes


POLST Status: DNR, Comfort Measures


Pain: No pain


Tiredness/Fatigue: Severe (7-10)


Drowsiness/Sedation: Moderate (4-6)


Nausea: None


Depression: Moderate (4-6)


Anxiety: Moderate (4-6)


Constipation: Yes


Performance Status: 


Patient is bedbound, this is a decline.  Patient is needing feeding, pacing, has

now presented with dysphagia and possible aspiration. Patient is bathed weekly, 

bed change.  Jodi-care provided on a regular basis patient is incontinent of 

both bowel and bladder.  Patient is sleeping more will put her at a PPS of 40%








- Palliative Care


Discussion: 


Introduced my recommendation regarding referral to hospice.  Tico is very 

introverted and isolated, has only allowed me to come when there are care needs 

and for medication refills.  Is very congenial and open to input, counseling, 

and appreciates the support.  Patient with less behaviors, but does take a while

to gain her trust and acceptance.  She did seem to recognize me.  He is hesitant

to have people coming on a regular basis, I did review the hospice benefit, 

minimal of nursing visits and less patient changing every 2 weeks, as well as 

time to meet the team.  He does feel with her current decline, that her care is 

actually easier, particularly with her behaviors not so ramped up.  Did share 

given patient's cognitive decline, most likely unable to access [facility] if 

needed assist, and now she has high risk for choking even on saliva.  Would not 

recommend leaving her alone.  Hospice would be able to provide respite so he can

get out and do errands and shopping etc. he reports he needs to think about it, 

will need to contact patient's daughter as she is at the POA.  He perceives she 

is declining as well, when shared I felt like he she fits now in the less than 6

months prognosis category, he is in agreement.  Counseling provided regarding 

pneumonia as a end of life event, option to treat or not treat, as well as if 

not treatment focus on comfort where hospice would become a good support for 

him.  He will consider it if not soon, at least in the future.





Patient in the past when she is more verbal and interactive, has somewhat 

perseverated on death, Tico did share his brother came a couple months ago to 

visit, though her cognitive status is declined, may he reports she did have 

enough cognizant to ask him if he came to see her before she . They did as a

couple lose a son to heart attack, he had down's syndrome and often comes up in 

past in conversation.








Impression and Recommendations





- Palliative Care


Impression: 


This is a 63-year-old woman with progressive dementia and behavioral 

disturbances, multiple sclerosis, now presents with dysphagia and high risk for 

aspiration.  She is cared for at home by her ex- Tico as his erickson worker,

recommendation today to transition to hospice given patient's ongoing decline.  

Palliative care to continue to provide support until transition made.





Recommendations/Counseling Done: 


1. Dysphagia.  Patient with decayed teeth, has always had to have modified diet 

secondary to chewing, now presents with significant difficulty with swallowing, 

choking, has been a gradual decline.  Counseling and  recommended pured diet, 

positioning to prevent aspiration. Instructed on crushing meds and putting them 

in pudding to better decrease her aspiration risk.Counseling provided can call 

[phone] if choking or need assist, does not need to take her to the hospital but 

would provide assistance.  Caregiver relieved with this information, encouraged 

to greet them at the door with SHASHANK ST form.  Discussed if the role of hospice, 

could have speech therapy come out and give further instructions if would be 

helpful.





2.  Dementia with behavioral disturbances.  Patient does appear to have some 

sundowning, though is needed only diazepam during this time.  From 6-8.  

Otherwise she is managed currently on her Seroquel 100 mg twice daily, BuSpar 10

mg 3 times daily secondary severe anxiety, and sertraline 100 mg at bedtime.





3.  Multiple sclerosis.  Patient continues to have ongoing decline, she now is 

bedbound.  She is incontinent of bowel and bladder.  They do use a Lucian for bed

changes.  Patient does present with stiffness, unclear if these are 

contractures, or related to her disease process.


4 Advanced care planning.  Counseling provided regarding hospice and hospice 

benefit.  Patient very difficult with strangers, though does seem much more 

mellow and less agitated with current medication regimen and decline in both 

cognitive and functional health.  Tico reticent as does like his privacy and 

quiet, does find it overwhelming to have people visiting or in the house.Patient

does have a SHASHANK ST in place, as a do not attempt resuscitation and comfort 

measures.  Her daughter Loli Zaragoza [phone] is her durable power of 

health , but is minimally involved.  Tico her ex- is her erickson 

worker for greater than 5 years, and provides oversight in her care.  Tico's 

health is compromised as well, though her care needs are less as she is 

deteriorated.  He has tried multiple times to explore other placement options 

but has not been able to, though this is related to her past behaviors and most 

likely would better fit into a skilled nursing facility via environment at this 

point.He will ponder hospice referral either sooner or in the future and contact

me when he is ready.





Time Spent: 


Time spent 45 minutes with greater than 50% of this done in counseling regarding

hospice benefit to management of dysphagia and anticipatory guidance and 

expected decline

## 2019-01-05 ENCOUNTER — HOSPITAL ENCOUNTER (OUTPATIENT)
Dept: HOSPITAL 76 - EMS | Age: 64
Discharge: ANOTHER HOSPITAL | End: 2019-01-05
Attending: SURGERY
Payer: MEDICAID

## 2019-01-05 ENCOUNTER — HOSPITAL ENCOUNTER (OUTPATIENT)
Dept: HOSPITAL 76 - ED | Age: 64
Setting detail: OBSERVATION
LOS: 3 days | Discharge: HOSPICE | End: 2019-01-08
Attending: NURSE PRACTITIONER | Admitting: HOSPITALIST
Payer: MEDICAID

## 2019-01-05 DIAGNOSIS — E87.6: ICD-10-CM

## 2019-01-05 DIAGNOSIS — Z51.5: ICD-10-CM

## 2019-01-05 DIAGNOSIS — G35: ICD-10-CM

## 2019-01-05 DIAGNOSIS — R15.9: ICD-10-CM

## 2019-01-05 DIAGNOSIS — E83.42: ICD-10-CM

## 2019-01-05 DIAGNOSIS — R62.7: ICD-10-CM

## 2019-01-05 DIAGNOSIS — N39.0: Primary | ICD-10-CM

## 2019-01-05 DIAGNOSIS — R32: ICD-10-CM

## 2019-01-05 DIAGNOSIS — H54.7: ICD-10-CM

## 2019-01-05 DIAGNOSIS — Z66: ICD-10-CM

## 2019-01-05 DIAGNOSIS — B96.20: ICD-10-CM

## 2019-01-05 DIAGNOSIS — L89.151: ICD-10-CM

## 2019-01-05 DIAGNOSIS — L89.141: ICD-10-CM

## 2019-01-05 DIAGNOSIS — E86.0: ICD-10-CM

## 2019-01-05 DIAGNOSIS — F32.9: ICD-10-CM

## 2019-01-05 DIAGNOSIS — E44.0: ICD-10-CM

## 2019-01-05 DIAGNOSIS — L89.121: ICD-10-CM

## 2019-01-05 DIAGNOSIS — L89.612: ICD-10-CM

## 2019-01-05 DIAGNOSIS — R63.8: Primary | ICD-10-CM

## 2019-01-05 DIAGNOSIS — E83.39: ICD-10-CM

## 2019-01-05 DIAGNOSIS — F03.90: ICD-10-CM

## 2019-01-05 LAB
ALBUMIN DIAFP-MCNC: 3.2 G/DL (ref 3.2–5.5)
ALBUMIN/GLOB SERPL: 0.7 {RATIO} (ref 1–2.2)
ALP SERPL-CCNC: 107 IU/L (ref 42–121)
ALT SERPL W P-5'-P-CCNC: 18 IU/L (ref 10–60)
ANION GAP SERPL CALCULATED.4IONS-SCNC: 11 MMOL/L (ref 6–13)
AST SERPL W P-5'-P-CCNC: 20 IU/L (ref 10–42)
BASOPHILS NFR BLD AUTO: 0.1 10^3/UL (ref 0–0.1)
BASOPHILS NFR BLD AUTO: 0.7 %
BILIRUB BLD-MCNC: 0.3 MG/DL (ref 0.2–1)
BUN SERPL-MCNC: 23 MG/DL (ref 6–20)
CALCIUM UR-MCNC: 9.3 MG/DL (ref 8.5–10.3)
CHLORIDE SERPL-SCNC: 101 MMOL/L (ref 101–111)
CLARITY UR REFRACT.AUTO: (no result)
CO2 SERPL-SCNC: 31 MMOL/L (ref 21–32)
CREAT SERPLBLD-SCNC: 0.7 MG/DL (ref 0.4–1)
EOSINOPHIL # BLD AUTO: 0.1 10^3/UL (ref 0–0.7)
EOSINOPHIL NFR BLD AUTO: 0.8 %
ERYTHROCYTE [DISTWIDTH] IN BLOOD BY AUTOMATED COUNT: 14.6 % (ref 12–15)
GFRSERPLBLD MDRD-ARVRAT: 85 ML/MIN/{1.73_M2} (ref 89–?)
GLOBULIN SER-MCNC: 4.3 G/DL (ref 2.1–4.2)
GLUCOSE SERPL-MCNC: 116 MG/DL (ref 70–100)
GLUCOSE UR QL STRIP.AUTO: NEGATIVE MG/DL
HGB UR QL STRIP: 14.4 G/DL (ref 12–16)
INR PPP: 1 (ref 0.8–1.2)
KETONES UR QL STRIP.AUTO: (no result) MG/DL
LIPASE SERPL-CCNC: 23 U/L (ref 22–51)
LYMPHOCYTES # SPEC AUTO: 1.8 10^3/UL (ref 1.5–3.5)
LYMPHOCYTES NFR BLD AUTO: 18.6 %
MCH RBC QN AUTO: 29.1 PG (ref 27–31)
MCHC RBC AUTO-ENTMCNC: 32.6 G/DL (ref 32–36)
MCV RBC AUTO: 89.4 FL (ref 81–99)
MONOCYTES # BLD AUTO: 0.6 10^3/UL (ref 0–1)
MONOCYTES NFR BLD AUTO: 6 %
NEUTROPHILS # BLD AUTO: 7.3 10^3/UL (ref 1.5–6.6)
NEUTROPHILS # SNV AUTO: 9.9 X10^3/UL (ref 4.8–10.8)
NEUTROPHILS NFR BLD AUTO: 73.9 %
NITRITE UR QL STRIP.AUTO: NEGATIVE
PDW BLD AUTO: 7.8 FL (ref 7.9–10.8)
PH UR STRIP.AUTO: 7 PH (ref 5–7.5)
PLATELET # BLD: 245 10^3/UL (ref 130–450)
PROT SPEC-MCNC: 7.5 G/DL (ref 6.7–8.2)
PROT UR STRIP.AUTO-MCNC: >=300 MG/DL
PROTHROM ACT/NOR PPP: 11.7 SECS (ref 9.9–12.6)
RBC # UR STRIP.AUTO: (no result) /UL
RBC MAR: 4.94 10^6/UL (ref 4.2–5.4)
SODIUM SERPLBLD-SCNC: 143 MMOL/L (ref 135–145)
SP GR UR STRIP.AUTO: >=1.03 (ref 1–1.03)
SQUAMOUS URNS QL MICRO: (no result)
UROBILINOGEN UR QL STRIP.AUTO: (no result) E.U./DL
UROBILINOGEN UR STRIP.AUTO-MCNC: NEGATIVE MG/DL
WBC CLUMPS URNS QL MICRO: PRESENT

## 2019-01-05 PROCEDURE — 85025 COMPLETE CBC W/AUTO DIFF WBC: CPT

## 2019-01-05 PROCEDURE — 96367 TX/PROPH/DG ADDL SEQ IV INF: CPT

## 2019-01-05 PROCEDURE — 85610 PROTHROMBIN TIME: CPT

## 2019-01-05 PROCEDURE — 83605 ASSAY OF LACTIC ACID: CPT

## 2019-01-05 PROCEDURE — 71045 X-RAY EXAM CHEST 1 VIEW: CPT

## 2019-01-05 PROCEDURE — 81003 URINALYSIS AUTO W/O SCOPE: CPT

## 2019-01-05 PROCEDURE — 99284 EMERGENCY DEPT VISIT MOD MDM: CPT

## 2019-01-05 PROCEDURE — 84443 ASSAY THYROID STIM HORMONE: CPT

## 2019-01-05 PROCEDURE — 93005 ELECTROCARDIOGRAM TRACING: CPT

## 2019-01-05 PROCEDURE — 96365 THER/PROPH/DIAG IV INF INIT: CPT

## 2019-01-05 PROCEDURE — 80053 COMPREHEN METABOLIC PANEL: CPT

## 2019-01-05 PROCEDURE — 36415 COLL VENOUS BLD VENIPUNCTURE: CPT

## 2019-01-05 PROCEDURE — 84100 ASSAY OF PHOSPHORUS: CPT

## 2019-01-05 PROCEDURE — 96372 THER/PROPH/DIAG INJ SC/IM: CPT

## 2019-01-05 PROCEDURE — 87181 SC STD AGAR DILUTION PER AGT: CPT

## 2019-01-05 PROCEDURE — 96366 THER/PROPH/DIAG IV INF ADDON: CPT

## 2019-01-05 PROCEDURE — 83690 ASSAY OF LIPASE: CPT

## 2019-01-05 PROCEDURE — 83735 ASSAY OF MAGNESIUM: CPT

## 2019-01-05 PROCEDURE — 96361 HYDRATE IV INFUSION ADD-ON: CPT

## 2019-01-05 PROCEDURE — 81001 URINALYSIS AUTO W/SCOPE: CPT

## 2019-01-05 PROCEDURE — 80069 RENAL FUNCTION PANEL: CPT

## 2019-01-05 PROCEDURE — 76770 US EXAM ABDO BACK WALL COMP: CPT

## 2019-01-05 PROCEDURE — 51701 INSERT BLADDER CATHETER: CPT

## 2019-01-05 PROCEDURE — 96368 THER/DIAG CONCURRENT INF: CPT

## 2019-01-05 PROCEDURE — 99233 SBSQ HOSP IP/OBS HIGH 50: CPT

## 2019-01-05 PROCEDURE — 87040 BLOOD CULTURE FOR BACTERIA: CPT

## 2019-01-05 PROCEDURE — 87086 URINE CULTURE/COLONY COUNT: CPT

## 2019-01-05 PROCEDURE — 92610 EVALUATE SWALLOWING FUNCTION: CPT

## 2019-01-05 PROCEDURE — 99283 EMERGENCY DEPT VISIT LOW MDM: CPT

## 2019-01-05 RX ADMIN — FAMOTIDINE SCH MG: 20 TABLET, FILM COATED ORAL at 21:33

## 2019-01-05 RX ADMIN — MIRTAZAPINE SCH MG: 15 TABLET, FILM COATED ORAL at 21:33

## 2019-01-05 NOTE — XRAY REPORT
Reason:  abn breath sounds, cough

Procedure Date:  01/05/2019   

Accession Number:  [id number] / [id number]                    

Procedure:  XR  - Chest 1 View X-Ray CPT Code:  26808

 

FULL RESULT:

 

 

EXAM:

CHEST RADIOGRAPHY

 

EXAM DATE: 1/5/2019 06:41 PM.

 

CLINICAL HISTORY: Abn breath sounds, cough.

 

COMPARISON: None.

 

TECHNIQUE: 1 view.

 

FINDINGS:

Lungs/Pleura: No focal opacities evident. No pleural effusion. No 

pneumothorax.

 

Mediastinum: Within exam limitations, the cardiomediastinal contour is 

normal.

 

Other: None.

 

IMPRESSION: Normal single view chest.

 

RADIA

## 2019-01-05 NOTE — ED PHYSICIAN DOCUMENTATION
History of Present Illness





- Stated complaint


Stated Complaint: FAILURE TO THRIVE





- Chief complaint


Chief Complaint: General





- History obtained from


History obtained from: Patient, Family (her ex  [place])





- History of Present Illness


Timing: Other (This is a 63-year-old woman with history of MS and dementia who 

lives with her ex- who is her caregiver and her D POA.  She is been in 

palliative care, hospice was discussed at the last visit but the patient did not

want to be in hospice and the ex- states that she was cogent during the 

conversation.  Over the last couple of weeks she has had a progressive decline, 

not eating or drinking and she has multiple bedsores that it started all of a 

sudden.  She is been bedbound for the last 6 months.)





Review of Systems


Unable to obtain: Confused





PD PAST MEDICAL HISTORY





- Past Medical History


Past Medical History: Yes


Neuro: Dementia, Multiple sclerosis


Psych: Depression





- Past Surgical History


Past Surgical History: Yes


/GYN: Dilation and currettage





- Present Medications


Home Medications: 


                                Ambulatory Orders











 Medication  Instructions  Recorded  Confirmed


 


Buspirone HCl 10 mg PO TID 08/15/17 11/29/18


 


Quetiapine Fumarate [Seroquel] 100 mg PO BID 08/16/17 11/29/18


 


diazePAM [Diazepam] 5 - 10 mg PO Q8HR PRN 08/16/17 11/29/18


 


Trazodone HCl 100 mg PO QPM 01/05/19 01/05/19














- Allergies


Allergies/Adverse Reactions: 


                                    Allergies











Allergy/AdvReac Type Severity Reaction Status Date / Time


 


No Known Drug Allergies Allergy   Verified 08/16/17 07:46














- Social History


Does the pt smoke?: No


Smoking Status: Never smoker


Does the pt drink ETOH?: No


Does the pt have substance abuse?: No





- Immunizations


Immunizations are current?: No





- POLST


Patient has POLST: Yes





PD ED PE NORMAL





- Vitals


Vital signs reviewed: Yes





- General


General: Other (She is somnolent but arousable, follows commands.  She appears 

very dry)





- HEENT


HEENT: No: Moist mucous membranes





- Neck


Neck: Supple, no meningeal sign, No bony TTP





- Cardiac


Cardiac: RRR, No murmur





- Respiratory


Respiratory: No respiratory distress, Other (Left basilar rhonchi)





- Abdomen


Abdomen: Non tender





- Back


Back: No CVA TTP, No spinal TTP





- Derm


Derm: Other (Multiple pressure sores, most of which are grade 1 including the 

sacrum, left iliac, left scapula, left axilla.  She has a grade 2 over the right

 ankle.)





Results





- Vitals


Vitals: 


                               Vital Signs - 24 hr











  01/05/19





  17:47


 


Temperature 36.4 C L


 


Heart Rate 95


 


Respiratory 16





Rate 


 


Blood Pressure 98/69


 


O2 Saturation 95








                                     Oxygen











O2 Source                      Room air

















- Labs


Labs: 


                                Laboratory Tests











  01/05/19 01/05/19 01/05/19





  18:16 18:16 18:16


 


WBC  9.9  


 


RBC  4.94  


 


Hgb  14.4  


 


Hct  44.1  


 


MCV  89.4  


 


MCH  29.1  


 


MCHC  32.6  


 


RDW  14.6  


 


Plt Count  245  


 


MPV  7.8 L  


 


Neut # (Auto)  7.3 H  


 


Lymph # (Auto)  1.8  


 


Mono # (Auto)  0.6  


 


Eos # (Auto)  0.1  


 


Baso # (Auto)  0.1  


 


Absolute Nucleated RBC  0.02  


 


Nucleated RBC %  0.2  


 


PT   11.7 


 


INR   1.0 


 


Sodium    143


 


Potassium    3.3 L


 


Chloride    101


 


Carbon Dioxide    31


 


Anion Gap    11.0


 


BUN    23 H


 


Creatinine    0.7


 


Estimated GFR (MDRD)    85 L


 


Glucose    116 H


 


Lactic Acid   


 


Calcium    9.3


 


Total Bilirubin    0.3


 


AST    20


 


ALT    18


 


Alkaline Phosphatase    107


 


Total Protein    7.5


 


Albumin    3.2


 


Globulin    4.3 H


 


Albumin/Globulin Ratio    0.7 L


 


Lipase    23














  01/05/19





  18:16


 


WBC 


 


RBC 


 


Hgb 


 


Hct 


 


MCV 


 


MCH 


 


MCHC 


 


RDW 


 


Plt Count 


 


MPV 


 


Neut # (Auto) 


 


Lymph # (Auto) 


 


Mono # (Auto) 


 


Eos # (Auto) 


 


Baso # (Auto) 


 


Absolute Nucleated RBC 


 


Nucleated RBC % 


 


PT 


 


INR 


 


Sodium 


 


Potassium 


 


Chloride 


 


Carbon Dioxide 


 


Anion Gap 


 


BUN 


 


Creatinine 


 


Estimated GFR (MDRD) 


 


Glucose 


 


Lactic Acid  1.2


 


Calcium 


 


Total Bilirubin 


 


AST 


 


ALT 


 


Alkaline Phosphatase 


 


Total Protein 


 


Albumin 


 


Globulin 


 


Albumin/Globulin Ratio 


 


Lipase 














- Rads (name of study)


  ** 1v chest


Radiology: EMP read contemporaneously (NAD)





PD MEDICAL DECISION MAKING





- ED course


ED course: 





This is a 63-year-old woman with dementia and multiple sclerosis who presents 

with worsening mental status and evidence of dehydration.  Per the 

Ex-/caregiver/D POA she did not want to enter hospice.  She has had a 

progressive failure decline and will need wound care and placement and I spoke 

with Dr. Padilla for observation at 7:15 PM.





Departure





- Departure


Disposition: ED Place in Observation


Clinical Impression: 


 Multiple sclerosis





Dementia


Qualifiers:


 Dementia type: unspecified type Dementia behavioral disturbance: without 

behavioral disturbance Qualified Code(s): F03.90 - Unspecified dementia without 

behavioral disturbance





Failure to thrive


Qualifiers:


 Failure to thrive age range: in adult Qualified Code(s): R62.7 - Adult failure 

to thrive





Pressure ulcer


Qualifiers:


 Pressure injury location: unspecified location Pressure injury stage: stage 2 

Qualified Code(s): L89.92 - Pressure ulcer of unspecified site, stage 2





Condition: Stable

## 2019-01-05 NOTE — HISTORY & PHYSICAL EXAMINATION
Chief Complaint





- Chief Complaint


Chief Complaint: Poor appetite, FTT/Anorexia, weakness and dehydrated





History of Present Illness





- Admitted From


Admitted From:: ED





- History Obtained From


Records Reviewed: yes


History obtained from: DPOA-ex 


Exam Limitations: Patient demented





- History of Present Illness


HPI Comment/Other: 


This is a 63-year-old woman with history of MS and dementia who lives with her 

ex- who is her caregiver and her DPOA.  She is been in palliative care, 

hospice was discussed at the last visit but the patient did not want to be in 

hospice and the ex- states that she was cognitive during the 

conversation.  Over the last couple of weeks she has had a progressive decline, 

not eating or drinking and she has multiple bedsores that it started all of a 

sudden.  She is been bedbound for the last 6 months. On labs patient has a UTI 

although due to her demented status unable to convey dysuria but grimaces on 

pelvic palpation, is somewhat contracted, has K 3.3, BMI 20.1 and looks ca

chectic and chronically ill-appearing. VSS with bp 98/69 and appears to be dry 

clinically. She wants to be FULL code per DPOA. 








History





- Past Medical History


Cardiovascular: reports: None


Respiratory: reports: None


Neuro: reports: Dementia, Multiple sclerosis, Other


Endocrine/Autoimmune: reports: None


GI: reports: Other


: reports: Incontinence


HEENT: reports: Chronic vision loss


Psych: reports: Depression


Musculoskeletal: reports: Fatigue, Other


Derm: reports: None


Other Past Medical History: Vertigo, incontinent, contractures





- Past Surgical History


/GYN: reports: Dilation and currettage





- POLST


Patient has POLST: Yes





Meds/Allgy





- Home Medications


Home Medications: 


                                Ambulatory Orders











 Medication  Instructions  Recorded  Confirmed


 


Buspirone HCl 10 mg PO TID 08/15/17 01/05/19


 


Quetiapine Fumarate [Seroquel] 100 mg PO BID 08/16/17 01/05/19


 


diazePAM [Diazepam] 5 mg PO Q8HR PRN 08/16/17 01/05/19


 


Trazodone HCl 100 mg PO QPM 01/05/19 01/05/19














- Allergies


Allergies/Adverse Reactions: 


                                    Allergies











Allergy/AdvReac Type Severity Reaction Status Date / Time


 


No Known Drug Allergies Allergy   Verified 08/16/17 07:46














Review of Systems





- Constitutional


Constitutional: reports: Fatigue, Weakness, Poor appetite, Weight loss





- Ears, Nose & Throat


Ears, Nose & Throat: denies: Tinnitus, Vertigo





- Cardiovascular


Cariovascular: denies: Palpitations, Chest pain





- Respiratory


Respiratory: denies: Cough





- Musculoskeletal


Musculoskeletal: denies: Muscle pain





- Neurological


Neurological: reports: Memory problems





- Psychiatric


Psychiatric: reports: Depression





- All Other Systems


All Other Systems: reports: Reviewed and negative


Prior Level of Functionality: 


Patient bed bound for more than 6 months 





Exam





- Vital Signs


Reviewed Vital Signs: Yes


Vital Signs: 





                                Vital Signs x48h











  Temp Pulse Pulse Resp BP BP Pulse Ox


 


 01/05/19 20:30  36.8 C   95  22   142/82 H  96


 


 01/05/19 20:15  36.4 C L  92   20  104/66   94


 


 01/05/19 17:47  36.4 C L  95   16  98/69   95








Hypotensive AF, HR 95, 95% RA





- Physical Exam


General Appearance: positive: Lethargic, Other (Chronically ill-appearing and 

cachectic)


ENT: positive: Dry mucous membranes


Neck: positive: Trachea midline.  negative: No JVD, Thyromegaly


Cardiovascular: positive: Regular rate & rhythm, No murmur, No gallop.  n

egative: Irregularly irregular


Peripheral Pulses: positive: 2+


Abdomen: positive: Tenderness, Other.  negative: No organomegaly, No distention,

 Bruit


Extremities: positive: Non-tender, Other.  negative: Joint swelling


Neurologic/Psychiatric: positive: Disoriented to person, Disoriented to place, 

Disoriented to time (SP pain on deep palpation)





Sepsis Event Note (H)





- Evaluation


Current Stage of Sepsis: Ruled out





Conclusion/Plan





- Problem List


(1) UTI (urinary tract infection)


Conclusion/Plan: 


Likely from urinary stasuis and being bedbound. IV rocephin to continue. 


Qualifiers: 


   Urinary tract infection type: site unspecified 





(2) Failure to thrive


Conclusion/Plan: 


Anorexia seen as well, due to progressive dementia with possible underlying OP-

dysphagia, would have ST to eval for OP-dysphagia and food consistency. Would 

start megace plus remeron as she has hx depression/anxiety


Qualifiers: 


   Failure to thrive age range: in adult   Qualified Code(s): R62.7 - Adult 

failure to thrive   





(3) Protein-calorie malnutrition, moderate


Conclusion/Plan: 


Present on Admission. Moderate to severe protein calorie malnutrition with a BMI

 19, weight loss nutritional intae <50% of recommended intake <2weeks, muscle 

wasting seen on exam with cachexia, and evidence of pressure ulcers, Dietitian 

consult, improve hydration and nutritional status, megace and remeron initiated.

 Boost/Ensure cans TID. May need calorie counting. 








(4) Dementia


Conclusion/Plan: 


Seems progressive and with OP-dysphagia now with nutritional def. Aspiration 

precautions. ST to eval for food consistencies. 


Qualifiers: 


   Dementia type: unspecified type   Dementia behavioral disturbance: without 

behavioral disturbance   Qualified Code(s): F03.90 - Unspecified dementia 

without behavioral disturbance   





(5) Pressure ulcer


Conclusion/Plan: 


Present on admission, multiple sores but there is a noticeable, likely related 

to poor nutritional status.  Stage 2 pressure ulcer to right heel. Air mattress,

 cushioned heel protectors, frequent turning, optimize nutrition and avoid skin 

breakdown with emollients or barrier protection creams. Wound care consult.  


Qualifiers: 


   Pressure injury location: heel   Pressure injury stage: stage 2   Laterality:

 right   Qualified Code(s): L89.612 - Pressure ulcer of right heel, stage 2   





(6) Multiple sclerosis


Conclusion/Plan: 


Progressive with the need for palliative care services to recommend hospice, 

however patient is a full code at this point at would be needing this to be re-

addressed as the likelihood of complications from MS and progressive dementia 

are present. 








(7) Dehydration


Conclusion/Plan: 


Place on IVF's, correct electrolytes, check labs in am








(8) Hypokalemia


Conclusion/Plan: 


Sec to nutritional def and poor oral fluid intake. Replace K, check mag and 

renal panel in am. 








- Lab Results


Lab results reviewed: Yes


Fish Bones: 


                                 01/06/19 05:08





                                 01/06/19 05:08





- Diagnostic Imaging Results


Diagnostic Imaging Results: positive: Final report reviewed





- EKG Results


EKG Interpreted Independently: No





Core Measures





- Anticipated LOS


I expect patient to be DC'd or transferred within 96 hours.: Yes





- Issues


Hospital Issues and Management Plan: 


Palliative care consult needed, Hospice recommended 





- DVT/VTE - Prophylaxis


VTE/DVT Device ordered at admit?: No


Not Ordered - Medical Reason: Not indicated


VTE/DVT Prophylaxis med ordered at admit?: Yes





- Stroke - Rehab Assessment


Rehab services assessment to be ordered?: No





- AMI - Statin at Admit


Aspirin Prescribed on Admit: No


Not Ordered - Medical Reason: Not indicated

## 2019-01-06 LAB
ALBUMIN DIAFP-MCNC: 2.6 G/DL (ref 3.2–5.5)
ANION GAP SERPL CALCULATED.4IONS-SCNC: 5 MMOL/L (ref 6–13)
BASOPHILS NFR BLD AUTO: 0 10^3/UL (ref 0–0.1)
BASOPHILS NFR BLD AUTO: 0.7 %
BUN SERPL-MCNC: 18 MG/DL (ref 6–20)
CALCIUM UR-MCNC: 8 MG/DL (ref 8.5–10.3)
CHLORIDE SERPL-SCNC: 110 MMOL/L (ref 101–111)
CO2 SERPL-SCNC: 29 MMOL/L (ref 21–32)
CREAT SERPLBLD-SCNC: 0.6 MG/DL (ref 0.4–1)
EOSINOPHIL # BLD AUTO: 0.1 10^3/UL (ref 0–0.7)
EOSINOPHIL NFR BLD AUTO: 2 %
ERYTHROCYTE [DISTWIDTH] IN BLOOD BY AUTOMATED COUNT: 14.3 % (ref 12–15)
GFRSERPLBLD MDRD-ARVRAT: 101 ML/MIN/{1.73_M2} (ref 89–?)
GLUCOSE SERPL-MCNC: 131 MG/DL (ref 70–100)
HGB UR QL STRIP: 12.3 G/DL (ref 12–16)
LYMPHOCYTES # SPEC AUTO: 1.6 10^3/UL (ref 1.5–3.5)
LYMPHOCYTES NFR BLD AUTO: 27 %
MAGNESIUM SERPL-MCNC: 2 MG/DL (ref 1.7–2.8)
MCH RBC QN AUTO: 29.5 PG (ref 27–31)
MCHC RBC AUTO-ENTMCNC: 32.6 G/DL (ref 32–36)
MCV RBC AUTO: 90.4 FL (ref 81–99)
MONOCYTES # BLD AUTO: 0.4 10^3/UL (ref 0–1)
MONOCYTES NFR BLD AUTO: 6.6 %
NEUTROPHILS # BLD AUTO: 3.7 10^3/UL (ref 1.5–6.6)
NEUTROPHILS # SNV AUTO: 5.9 X10^3/UL (ref 4.8–10.8)
NEUTROPHILS NFR BLD AUTO: 63.7 %
PDW BLD AUTO: 7.9 FL (ref 7.9–10.8)
PHOSPHATE BLD-MCNC: 2.2 MG/DL (ref 2.5–4.6)
PLATELET # BLD: 217 10^3/UL (ref 130–450)
RBC MAR: 4.16 10^6/UL (ref 4.2–5.4)
SODIUM SERPLBLD-SCNC: 144 MMOL/L (ref 135–145)

## 2019-01-06 RX ADMIN — CHLORHEXIDINE GLUCONATE SCH ML: 1.2 SOLUTION ORAL at 20:09

## 2019-01-06 RX ADMIN — DEXTROSE MONOHYDRATE SCH MLS/HR: 50 INJECTION, SOLUTION INTRAVENOUS at 20:16

## 2019-01-06 RX ADMIN — POLYETHYLENE GLYCOL 3350 SCH: 17 POWDER, FOR SOLUTION ORAL at 09:15

## 2019-01-06 RX ADMIN — SODIUM CHLORIDE AND POTASSIUM CHLORIDE SCH MLS/HR: 9; 1.49 INJECTION, SOLUTION INTRAVENOUS at 09:59

## 2019-01-06 RX ADMIN — MIRTAZAPINE SCH MG: 15 TABLET, FILM COATED ORAL at 20:09

## 2019-01-06 RX ADMIN — FAMOTIDINE SCH MG: 20 TABLET, FILM COATED ORAL at 20:09

## 2019-01-06 RX ADMIN — POTASSIUM CHLORIDE SCH MLS/HR: 7.46 INJECTION, SOLUTION INTRAVENOUS at 02:21

## 2019-01-06 RX ADMIN — POTASSIUM CHLORIDE SCH MLS/HR: 7.46 INJECTION, SOLUTION INTRAVENOUS at 01:19

## 2019-01-06 RX ADMIN — FAMOTIDINE SCH MG: 20 TABLET, FILM COATED ORAL at 09:15

## 2019-01-06 RX ADMIN — ENOXAPARIN SODIUM SCH MG: 100 INJECTION SUBCUTANEOUS at 09:12

## 2019-01-06 RX ADMIN — HYDROCODONE BITARTRATE AND ACETAMINOPHEN PRN TAB: 5; 325 TABLET ORAL at 17:12

## 2019-01-06 RX ADMIN — SODIUM CHLORIDE, PRESERVATIVE FREE SCH ML: 5 INJECTION INTRAVENOUS at 09:15

## 2019-01-06 RX ADMIN — SODIUM CHLORIDE AND POTASSIUM CHLORIDE SCH MLS/HR: 9; 1.49 INJECTION, SOLUTION INTRAVENOUS at 17:35

## 2019-01-06 RX ADMIN — CHLORHEXIDINE GLUCONATE SCH: 1.2 SOLUTION ORAL at 14:28

## 2019-01-06 RX ADMIN — SODIUM CHLORIDE, PRESERVATIVE FREE SCH: 5 INJECTION INTRAVENOUS at 02:39

## 2019-01-06 RX ADMIN — SODIUM CHLORIDE, PRESERVATIVE FREE SCH: 5 INJECTION INTRAVENOUS at 17:35

## 2019-01-06 RX ADMIN — MEGESTROL ACETATE SCH MG: 40 SUSPENSION ORAL at 09:15

## 2019-01-06 NOTE — PROVIDER PROGRESS NOTE
Subjective





- Prog Note Date


Prog Note Date: 01/06/19


Prog Note Time: 13:03





- Subjective


Pt reports feeling: Improved


Subjective: 


Winsome states that she has a poor appetite, and that her buttocks hurts.  She

denies chest pain, nausea, vomiting, a rash, dizziness, or a new cough.





Current Medications





- Current Medications


Current Medications: 


Active Medications:  Acetaminophen (Tylenol)  650 mg PO Q4HR PRN


Hydrocodone Bitart/Acetaminophen (Norco 5/325)  1 tab PO Q4HR PRN


Buspirone HCl (Buspar)  10 mg PO TID [facility]


Chlorhexidine Gluconate (Peridex)  15 ml PO BID [facility]K


Enoxaparin Sodium (Lovenox)  40 mg SUBQ DAILY [facility]


Famotidine (Pepcid)  20 mg PO BID [facility]


Ceftriaxone Sodium 1 gm/ (Sodium Chloride)  100 mls @ 200 mls/hr IV Q24H [facility]


Potassium Chloride/Sodium Chloride (Normal Saline 0.9% W/20 Meq Kcl)  1,000 mls 

@ 125 mls/hr IV .Q8H [facility]


Megestrol Acetate (Megace)  800 mg PO DAILY [facility]


Mineral Oil (Cavilon)  1 applic TOP PRN PRN


Mirtazapine (Remeron)  7.5 mg PO QPM [facility]


Ondansetron HCl (Zofran Inj)  4 mg IVP Q6HR PRN


Ondansetron HCl (Zofran Odt)  4 mg TL Q6HR PRN


Polyethylene Glycol (Miralax)  17 gm PO DAILY [facility]


Sodium Chloride (Normal Saline Flush 0.9%)  10 ml IVP PRN PRN


Sodium Chloride (Normal Saline Flush 0.9%)  10 ml IVP 0100,0900,1700 [facility]


Trazodone HCl (Desyrel)  100 mg PO QPM [facility]


HOME meds:  Buspirone HCl 10 mg PO TID 08/15/17 


Quetiapine Fumarate [Seroquel] 100 mg PO BID 08/16/17 


diazePAM [Diazepam] 5 mg PO Q8HR PRN 08/16/17 


Trazodone HCl 100 mg PO QPM 01/05/19 





Objective





- Vital Signs/Intake & Output


Reviewed Vital Signs: Yes


Vital Signs: 


                                Vital Signs x48h











  Temp Pulse Resp BP Pulse Ox


 


 01/06/19 07:52  36.4 C L  96  14  92/50 L  96











Intake & Output: 


                                 Intake & Output











 01/03/19 01/04/19 01/05/19 01/06/19





 23:59 23:59 23:59 23:59


 


Intake Total   4631.884 2784.833


 


Balance   3777.272 9823.833














- Objective


General Appearance: positive: No acute distress, Alert


Eyes Bilateral: positive: PERRL


Eyes: OU Conjunctivae pale


ENT: positive: Oral lesions, Dry mucous membranes, Other (tooth malformation)


Neck: positive: Thyroid nml, No JVD, Trachea midline


Respiratory: positive: Chest non-tender, No respiratory distress, Rhonchi, Other

 (shallow breathing-chronic)


Cardiovascular: positive: Regular rate & rhythm, No gallop, Systolic murmur


Peripheral Pulses: 1+ Radial (R), 1+ Radial (L)


Abdomen: positive: Nml bowel sounds, No distention, Tenderness, Guarding


Back: positive: CVA tenderness (R), CVA tenderness (L)


Skin: positive: No rash, Warm, Dry, Pallor


Extremities: positive: Non-tender, Full ROM


Neurologic/Psychiatric: positive: Disoriented to place, Disoriented to time, 

Weakness, Sensory loss, Slurred/abnml speech (sluggish speech), Depressed 

mood/affect, Other (inappropriate comments, out of context.)


Reflexes: Bicep (R): 1+, Bicep (L): 1+, Ankle (R): 1+, Ankle (L): 1+





- Lab Results


Fish Bones: 


                                 01/06/19 05:08





                                 01/06/19 05:08


Other Labs: 


                               Lab Results x24hrs











  01/06/19 01/06/19 01/05/19 Range/Units





  05:08 05:08 18:50 


 


WBC   5.9   (4.8-10.8)  x10^3/uL


 


RBC   4.16 L   (4.20-5.40)  10^6/uL


 


Hgb   12.3   (12.0-16.0)  g/dL


 


Hct   37.6   (37.0-47.0)  %


 


MCV   90.4   (81.0-99.0)  fL


 


MCH   29.5   (27.0-31.0)  pg


 


MCHC   32.6   (32.0-36.0)  g/dL


 


RDW   14.3   (12.0-15.0)  %


 


Plt Count   217   (130-450)  10^3/uL


 


MPV   7.9   (7.9-10.8)  fL


 


Neut # (Auto)   3.7   (1.5-6.6)  10^3/uL


 


Lymph # (Auto)   1.6   (1.5-3.5)  10^3/uL


 


Mono # (Auto)   0.4   (0.0-1.0)  10^3/uL


 


Eos # (Auto)   0.1   (0.0-0.7)  10^3/uL


 


Baso # (Auto)   0.0   (0.0-0.1)  10^3/uL


 


Absolute Nucleated RBC   0.01   x10^3/uL


 


Nucleated RBC %   0.1   /100WBC


 


PT     (9.9-12.6)  secs


 


INR     (0.8-1.2)  


 


Sodium  144    (135-145)  mmol/L


 


Potassium  3.1 L    (3.5-5.0)  mmol/L


 


Chloride  110    (101-111)  mmol/L


 


Carbon Dioxide  29    (21-32)  mmol/L


 


Anion Gap  5.0 L    (6-13)  


 


BUN  18    (6-20)  mg/dL


 


Creatinine  0.6    (0.4-1.0)  mg/dL


 


Estimated GFR (MDRD)  101    (>89)  


 


Glucose  131 H    ()  mg/dL


 


Lactic Acid     (0.5-2.2)  mmol/L


 


Calcium  8.0 L    (8.5-10.3)  mg/dL


 


Phosphorus  2.2 L    (2.5-4.6)  mg/dL


 


Magnesium  2.0    (1.7-2.8)  mg/dL


 


Total Bilirubin     (0.2-1.0)  mg/dL


 


AST     (10-42)  IU/L


 


ALT     (10-60)  IU/L


 


Alkaline Phosphatase     ()  IU/L


 


Total Protein     (6.7-8.2)  g/dL


 


Albumin  2.6 L    (3.2-5.5)  g/dL


 


Globulin     (2.1-4.2)  g/dL


 


Albumin/Globulin Ratio     (1.0-2.2)  


 


Lipase     (22-51)  U/L


 


TSH     (0.34-5.60)  uIU/mL


 


Urine Color    BROWN  


 


Urine Clarity    TURBID  (CLEAR)  


 


Urine pH    7.0  (5.0-7.5)  PH


 


Ur Specific Gravity    >=1.030 H  (1.002-1.030)  


 


Urine Protein    >=300 H  (NEGATIVE)  mg/dL


 


Urine Glucose (UA)    NEGATIVE  (NEGATIVE)  mg/dL


 


Urine Ketones    TRACE  (NEGATIVE)  mg/dL


 


Urine Occult Blood    MODERATE H  (NEGATIVE)  


 


Urine Nitrite    NEGATIVE  (NEGATIVE)  


 


Urine Bilirubin    NEGATIVE  (NEGATIVE)  


 


Urine Urobilinogen    0.2 (NORMAL)  (NORMAL)  E.U./dL


 


Ur Leukocyte Esterase    LARGE H  (NEGATIVE)  


 


Urine RBC    11-25 H  (0-5)  /HPF


 


Urine WBC    >25 H  (0-5)  /HPF


 


Urine WBC Clumps    PRESENT  


 


Ur Squamous Epith Cells    NONE SEEN  (<= Few)  


 


Urine Bacteria    Many H  (None Seen)  /HPF


 


Ur Microscopic Review    INDICATED  


 


Urine Culture Comments    INDICATED  














  01/05/19 01/05/19 01/05/19 Range/Units





  18:16 18:16 18:16 


 


WBC     (4.8-10.8)  x10^3/uL


 


RBC     (4.20-5.40)  10^6/uL


 


Hgb     (12.0-16.0)  g/dL


 


Hct     (37.0-47.0)  %


 


MCV     (81.0-99.0)  fL


 


MCH     (27.0-31.0)  pg


 


MCHC     (32.0-36.0)  g/dL


 


RDW     (12.0-15.0)  %


 


Plt Count     (130-450)  10^3/uL


 


MPV     (7.9-10.8)  fL


 


Neut # (Auto)     (1.5-6.6)  10^3/uL


 


Lymph # (Auto)     (1.5-3.5)  10^3/uL


 


Mono # (Auto)     (0.0-1.0)  10^3/uL


 


Eos # (Auto)     (0.0-0.7)  10^3/uL


 


Baso # (Auto)     (0.0-0.1)  10^3/uL


 


Absolute Nucleated RBC     x10^3/uL


 


Nucleated RBC %     /100WBC


 


PT     (9.9-12.6)  secs


 


INR     (0.8-1.2)  


 


Sodium    143  (135-145)  mmol/L


 


Potassium    3.3 L  (3.5-5.0)  mmol/L


 


Chloride    101  (101-111)  mmol/L


 


Carbon Dioxide    31  (21-32)  mmol/L


 


Anion Gap    11.0  (6-13)  


 


BUN    23 H  (6-20)  mg/dL


 


Creatinine    0.7  (0.4-1.0)  mg/dL


 


Estimated GFR (MDRD)    85 L  (>89)  


 


Glucose    116 H  ()  mg/dL


 


Lactic Acid   1.2   (0.5-2.2)  mmol/L


 


Calcium    9.3  (8.5-10.3)  mg/dL


 


Phosphorus     (2.5-4.6)  mg/dL


 


Magnesium     (1.7-2.8)  mg/dL


 


Total Bilirubin    0.3  (0.2-1.0)  mg/dL


 


AST    20  (10-42)  IU/L


 


ALT    18  (10-60)  IU/L


 


Alkaline Phosphatase    107  ()  IU/L


 


Total Protein    7.5  (6.7-8.2)  g/dL


 


Albumin    3.2  (3.2-5.5)  g/dL


 


Globulin    4.3 H  (2.1-4.2)  g/dL


 


Albumin/Globulin Ratio    0.7 L  (1.0-2.2)  


 


Lipase    23  (22-51)  U/L


 


TSH  0.84    (0.34-5.60)  uIU/mL


 


Urine Color     


 


Urine Clarity     (CLEAR)  


 


Urine pH     (5.0-7.5)  PH


 


Ur Specific Gravity     (1.002-1.030)  


 


Urine Protein     (NEGATIVE)  mg/dL


 


Urine Glucose (UA)     (NEGATIVE)  mg/dL


 


Urine Ketones     (NEGATIVE)  mg/dL


 


Urine Occult Blood     (NEGATIVE)  


 


Urine Nitrite     (NEGATIVE)  


 


Urine Bilirubin     (NEGATIVE)  


 


Urine Urobilinogen     (NORMAL)  E.U./dL


 


Ur Leukocyte Esterase     (NEGATIVE)  


 


Urine RBC     (0-5)  /HPF


 


Urine WBC     (0-5)  /HPF


 


Urine WBC Clumps     


 


Ur Squamous Epith Cells     (<= Few)  


 


Urine Bacteria     (None Seen)  /HPF


 


Ur Microscopic Review     


 


Urine Culture Comments     














  01/05/19 01/05/19 Range/Units





  18:16 18:16 


 


WBC   9.9  (4.8-10.8)  x10^3/uL


 


RBC   4.94  (4.20-5.40)  10^6/uL


 


Hgb   14.4  (12.0-16.0)  g/dL


 


Hct   44.1  (37.0-47.0)  %


 


MCV   89.4  (81.0-99.0)  fL


 


MCH   29.1  (27.0-31.0)  pg


 


MCHC   32.6  (32.0-36.0)  g/dL


 


RDW   14.6  (12.0-15.0)  %


 


Plt Count   245  (130-450)  10^3/uL


 


MPV   7.8 L  (7.9-10.8)  fL


 


Neut # (Auto)   7.3 H  (1.5-6.6)  10^3/uL


 


Lymph # (Auto)   1.8  (1.5-3.5)  10^3/uL


 


Mono # (Auto)   0.6  (0.0-1.0)  10^3/uL


 


Eos # (Auto)   0.1  (0.0-0.7)  10^3/uL


 


Baso # (Auto)   0.1  (0.0-0.1)  10^3/uL


 


Absolute Nucleated RBC   0.02  x10^3/uL


 


Nucleated RBC %   0.2  /100WBC


 


PT  11.7   (9.9-12.6)  secs


 


INR  1.0   (0.8-1.2)  


 


Sodium    (135-145)  mmol/L


 


Potassium    (3.5-5.0)  mmol/L


 


Chloride    (101-111)  mmol/L


 


Carbon Dioxide    (21-32)  mmol/L


 


Anion Gap    (6-13)  


 


BUN    (6-20)  mg/dL


 


Creatinine    (0.4-1.0)  mg/dL


 


Estimated GFR (MDRD)    (>89)  


 


Glucose    ()  mg/dL


 


Lactic Acid    (0.5-2.2)  mmol/L


 


Calcium    (8.5-10.3)  mg/dL


 


Phosphorus    (2.5-4.6)  mg/dL


 


Magnesium    (1.7-2.8)  mg/dL


 


Total Bilirubin    (0.2-1.0)  mg/dL


 


AST    (10-42)  IU/L


 


ALT    (10-60)  IU/L


 


Alkaline Phosphatase    ()  IU/L


 


Total Protein    (6.7-8.2)  g/dL


 


Albumin    (3.2-5.5)  g/dL


 


Globulin    (2.1-4.2)  g/dL


 


Albumin/Globulin Ratio    (1.0-2.2)  


 


Lipase    (22-51)  U/L


 


TSH    (0.34-5.60)  uIU/mL


 


Urine Color    


 


Urine Clarity    (CLEAR)  


 


Urine pH    (5.0-7.5)  PH


 


Ur Specific Gravity    (1.002-1.030)  


 


Urine Protein    (NEGATIVE)  mg/dL


 


Urine Glucose (UA)    (NEGATIVE)  mg/dL


 


Urine Ketones    (NEGATIVE)  mg/dL


 


Urine Occult Blood    (NEGATIVE)  


 


Urine Nitrite    (NEGATIVE)  


 


Urine Bilirubin    (NEGATIVE)  


 


Urine Urobilinogen    (NORMAL)  E.U./dL


 


Ur Leukocyte Esterase    (NEGATIVE)  


 


Urine RBC    (0-5)  /HPF


 


Urine WBC    (0-5)  /HPF


 


Urine WBC Clumps    


 


Ur Squamous Epith Cells    (<= Few)  


 


Urine Bacteria    (None Seen)  /HPF


 


Ur Microscopic Review    


 


Urine Culture Comments    














ABX Reporting


Has patient been on IV antibiotics over the past 48 hours?: Yes





Sepsis Event Note (H)





- Evaluation


Current Stage of Sepsis: Sepsis


Possible source of Sepsis: positive: Genitourinary





- Sepsis Criteria


Sepsis Criteria: Recorded Heart Rate greater than 90 bpm, CNS: altered 

consciousness (unrelated to primary neuro pathology), Renal: urine output less 

than 0.5ml/kg/hr for 2 hours or creatinine gr





Assessment/Plan





- Problem List


(1) Complicated UTI (urinary tract infection)


Impression: 


Preliminary urine cultures results show e. coli with final sensitivities to 

follow.  She has been treated with IV Rocephin that continues today.  She is a 

bit hypotensive and has bilateral flank pain with tenderness in her low abdomen 

on exam.  She has chronic urinary incontinence and her urine appears judy.  She

 does not have a chronic indwelling rivas as expected with patient's who have 

MS.  


Plan:  Await kidney US, bladder scan, continue IV treatment, await final 

cultures, and continue Pure Wick.








(2) Hypokalemia


Impression: 


The patient presented with a low K+ of 3.3, that became worse after supplement 

and this morning is 3.1.  She has been given IV replacement and her maintenance 

fluids are now changed to NS with 20Meq at 125 mL.  Her oral intake remains 

poor, and she admits to recent diarrhea, although is a poor historian.


Plan:  Daily labs, continue IV fluids, replace as needed.  Monitor for diarrhea 

or other causes.








(3) Multiple sclerosis


Impression: 


She was diagnosed originally in 2002 with her MS, with symptoms related to 

visual changes, and lower extremity weakness.  She was originally able to 

ambulate a few steps, toilet, but has had ongoing decline to pivot transfers, 

now to bed bound status.  She has had upper and lower extremity muscle wasting, 

does have some spasticity, intermittent discomfort.  She takes trazadone, 

seroquel and valium at home for insomnia, agitation, and muscle spasticity.  She

 does not have a chronic rivas, but has a history of both urinary and bowel 

incontinence.  I suspect that she has a neurogenic bladder and retains urine, so

 routine bladder scans have ordered.  She cannot elaborate on her own medical 

conditions on exam today, likely a consequence of her acute illness.


Plan:  Continue bed rest with every 2 hours turns.  Wound care consult for bed 

sores including stage 1 coccyx and profound heel ulcers.  








(4) Protein-calorie malnutrition, moderate


Impression: 


The patient has a low BMI of 19, and is 47 kg.  She has been bed bound for 

greater than 6 months and is seen by Palliative care out patient.  She is cared 

for by her care-givers and ex-.  Her POA is her daughter Loli.  She 

continued to have a poor appetite and today denies nausea or vomiting, although 

is a poor historian.  


Plan:  Continue to encourage meals, dietary consult and await swallowing 

evaluation.








(5) Urinary incontinence


Impression: 


The patient admits to chronic urinary incontinence and recurrent UTIs, and upon 

review of the chart this is accurate.  She has an acute complicated UTI and 

being treated with IV Rocephin.  A pure wick is in place, but I suspect urine 

retention, so bladders scans are ordered.


Plan:  Continue pure wick, monitor for retention.








(6) Bowel incontinence


Impression: 


The patient has this as a consequence to her MS.  She is no longer ambulatory.  

She has care givers.  On exam, the patient admits to recent diarrhea at home 

which could have led to her acute complicated UTI.


Plan:  Continue to monitor.








(7) Dementia


Impression: 


The patient has this and her POA is her daughter, Loli.  She appears to be 

a very poor historian on exam and says statements such as, "I am not allowed to 

say, or I probably should not have told you that".  She is a poor historian.  


Plan:  Continue to treat this acute illness, monitor for worsening mentation. 


Qualifiers: 


   Dementia type: unspecified type   Dementia behavioral disturbance: without 

behavioral disturbance   Qualified Code(s): F03.90 - Unspecified dementia 

without behavioral disturbance   





(8) Pressure ulcer of coccygeal region, stage 1


Impression: 


As per chart review, see notes section for pictures, the patient has an intact 

pressure sore to her mid-coccyx and complains of buttock pain on exam from 

chronic bed rest.


Plan:  Await wound consult, frequent turns.








(9) Pressure injury of right heel, stage 2


Impression: 


The patient is bedbound at home and is cared for by care givers and her ex-

.  See chart for pictures.  


Plan:  Await wound consult, float heels, frequent turns.

## 2019-01-06 NOTE — ULTRASOUND REPORT
Reason:  complicated UTI/MS/evaluate for pyelo

Procedure Date:  01/06/2019   

Accession Number:  [id number] / [id number]                    

Procedure:  US  - Retroperitoneal CPT Code:  

 

FULL RESULT:

 

 

EXAM:

RENAL ULTRASOUND

 

EXAM DATE: 1/6/2019 06:37 PM.

 

CLINICAL HISTORY: Complicated UTI. MS. Evaluate for pyelo.

 

COMPARISON: None.

 

TECHNIQUE: Real-time scanning was performed with static images obtained.

 

FINDINGS:

Right Kidney: 9.3 x 4.5 x 4.2 cm. Normal echotexture with no stones, 

contour-deforming masses, or hydronephrosis.

 

Left Kidney: 9.0 x 4.1 x 5.0 cm. Normal echotexture with no stones, 

contour-deforming masses, or hydronephrosis.

 

Bladder: No ureteral jets seen. The prevoid bladder volume was 16.5 cc.

 

Other: None.

IMPRESSION:

1. Unremarkable renal ultrasound.

2. Fairly empty bladder with no ureteral jets seen.

 

[facility]

## 2019-01-07 LAB
ALBUMIN DIAFP-MCNC: 2.4 G/DL (ref 3.2–5.5)
ALBUMIN/GLOB SERPL: 0.8 {RATIO} (ref 1–2.2)
ALP SERPL-CCNC: 75 IU/L (ref 42–121)
ALT SERPL W P-5'-P-CCNC: 13 IU/L (ref 10–60)
ANION GAP SERPL CALCULATED.4IONS-SCNC: 5 MMOL/L (ref 6–13)
AST SERPL W P-5'-P-CCNC: 15 IU/L (ref 10–42)
BASOPHILS NFR BLD AUTO: 0 10^3/UL (ref 0–0.1)
BASOPHILS NFR BLD AUTO: 0.8 %
BILIRUB BLD-MCNC: 0.2 MG/DL (ref 0.2–1)
BUN SERPL-MCNC: 8 MG/DL (ref 6–20)
CALCIUM UR-MCNC: 8 MG/DL (ref 8.5–10.3)
CHLORIDE SERPL-SCNC: 114 MMOL/L (ref 101–111)
CO2 SERPL-SCNC: 26 MMOL/L (ref 21–32)
CREAT SERPLBLD-SCNC: 0.6 MG/DL (ref 0.4–1)
EOSINOPHIL # BLD AUTO: 0.1 10^3/UL (ref 0–0.7)
EOSINOPHIL NFR BLD AUTO: 2.1 %
ERYTHROCYTE [DISTWIDTH] IN BLOOD BY AUTOMATED COUNT: 15.2 % (ref 12–15)
GFRSERPLBLD MDRD-ARVRAT: 101 ML/MIN/{1.73_M2} (ref 89–?)
GLOBULIN SER-MCNC: 2.9 G/DL (ref 2.1–4.2)
GLUCOSE SERPL-MCNC: 94 MG/DL (ref 70–100)
HGB UR QL STRIP: 11.3 G/DL (ref 12–16)
LYMPHOCYTES # SPEC AUTO: 1.5 10^3/UL (ref 1.5–3.5)
LYMPHOCYTES NFR BLD AUTO: 24.3 %
MAGNESIUM SERPL-MCNC: 1.5 MG/DL (ref 1.7–2.8)
MCH RBC QN AUTO: 29.1 PG (ref 27–31)
MCHC RBC AUTO-ENTMCNC: 32 G/DL (ref 32–36)
MCV RBC AUTO: 91.2 FL (ref 81–99)
MONOCYTES # BLD AUTO: 0.3 10^3/UL (ref 0–1)
MONOCYTES NFR BLD AUTO: 5.5 %
NEUTROPHILS # BLD AUTO: 4 10^3/UL (ref 1.5–6.6)
NEUTROPHILS # SNV AUTO: 6 X10^3/UL (ref 4.8–10.8)
NEUTROPHILS NFR BLD AUTO: 67.3 %
PDW BLD AUTO: 7.3 FL (ref 7.9–10.8)
PHOSPHATE BLD-MCNC: 1.7 MG/DL (ref 2.5–4.6)
PLATELET # BLD: 206 10^3/UL (ref 130–450)
PROT SPEC-MCNC: 5.3 G/DL (ref 6.7–8.2)
RBC MAR: 3.89 10^6/UL (ref 4.2–5.4)
SODIUM SERPLBLD-SCNC: 145 MMOL/L (ref 135–145)

## 2019-01-07 RX ADMIN — SODIUM CHLORIDE, PRESERVATIVE FREE SCH: 5 INJECTION INTRAVENOUS at 09:32

## 2019-01-07 RX ADMIN — DIMETHICONE PRN APPLIC: 13000 CREAM TOPICAL at 02:10

## 2019-01-07 RX ADMIN — FAMOTIDINE SCH MG: 20 TABLET, FILM COATED ORAL at 09:31

## 2019-01-07 RX ADMIN — POLYETHYLENE GLYCOL 3350 SCH GM: 17 POWDER, FOR SOLUTION ORAL at 09:31

## 2019-01-07 RX ADMIN — HYDROCODONE BITARTRATE AND ACETAMINOPHEN PRN TAB: 5; 325 TABLET ORAL at 09:32

## 2019-01-07 RX ADMIN — ENOXAPARIN SODIUM SCH MG: 100 INJECTION SUBCUTANEOUS at 09:30

## 2019-01-07 RX ADMIN — SODIUM CHLORIDE, PRESERVATIVE FREE SCH: 5 INJECTION INTRAVENOUS at 23:39

## 2019-01-07 RX ADMIN — DEXTROSE MONOHYDRATE SCH MLS/HR: 50 INJECTION, SOLUTION INTRAVENOUS at 19:51

## 2019-01-07 RX ADMIN — SODIUM CHLORIDE SCH MLS/HR: 9 INJECTION, SOLUTION INTRAVENOUS at 16:38

## 2019-01-07 RX ADMIN — FAMOTIDINE SCH MG: 20 TABLET, FILM COATED ORAL at 20:08

## 2019-01-07 RX ADMIN — SODIUM CHLORIDE, PRESERVATIVE FREE SCH: 5 INJECTION INTRAVENOUS at 16:38

## 2019-01-07 RX ADMIN — MIRTAZAPINE SCH MG: 15 TABLET, FILM COATED ORAL at 20:07

## 2019-01-07 RX ADMIN — MEGESTROL ACETATE SCH MG: 40 SUSPENSION ORAL at 09:31

## 2019-01-07 RX ADMIN — SODIUM CHLORIDE, PRESERVATIVE FREE SCH: 5 INJECTION INTRAVENOUS at 02:19

## 2019-01-07 RX ADMIN — CHLORHEXIDINE GLUCONATE SCH ML: 1.2 SOLUTION ORAL at 20:08

## 2019-01-07 RX ADMIN — DIMETHICONE PRN APPLIC: 13000 CREAM TOPICAL at 05:28

## 2019-01-07 RX ADMIN — SODIUM CHLORIDE AND POTASSIUM CHLORIDE SCH MLS/HR: 9; 1.49 INJECTION, SOLUTION INTRAVENOUS at 01:36

## 2019-01-07 RX ADMIN — CHLORHEXIDINE GLUCONATE SCH ML: 1.2 SOLUTION ORAL at 09:30

## 2019-01-07 NOTE — CONSULTATION NOTE
Palliative Care Follow Up





- Referral


Referring Provider: Daren Padilla MD


Time of Visit: 4140-6884;1-1:45;3:30-3:45


Referral setting: Hospitalized patient


Referral Reason: MS/dementia/UTI/DTI right heel





- Information Sources


Records reviewed: RN notes reviewed, Previous records reviewed


History/Review of Systems obtained from: Family (met with  Tico)


Exam limitations: Clinical condition (patient with advanced dementia; unable to 

answer questions other than yes/no)





- History of Present Illness Update


Brief HPI Update: 


This is a 63-year-old woman with progressive MS who I am well familiar with from

the outpatient setting.  I had seen her on 11/29/18 with a recommendation 

secondary to weight loss, increasing dysphagia, upper and lower extremity muscle

wasting, and high risk for aspiration to transition to hospice.She is cared for 

by her ex- Tico Zaragoza who provides most of the oversight and address his 

care needs.  Her daughter though Loli Zaragoza is actually the D POA.  In the

context of this conversation of transitioning to hospice there was multiple 

exchanges and conversations triggered by Winsome's significant fear of dying. 

They had declined hospice, in fact and wanted to redo her [facility], as they felt 

obligated amend this given conversations with the patient despite her dementia 

and lack of medical decision making capacity. We had scheduled a visit after the

holidays per their request, to further explore this further as well as to 

evaluate her decline.  Unfortunately patient has acutely declined over the last 

7-10 days, now presenting with a UTI, and what was quite traumatic for her 

caregiver which was skin breakdown and minimal oral intake.





Today I find her somewhat conversational, though this is nonsensical.  She is 

able to answer yes/no questions, but is unable to identify where she is, 

understand what is happening to her, are be able to participate in decision-

making.  In review with Tico her caregiver, he does understand now the context of

concern of both staff and myself, regarding her DNA R/DNI status.  He 

acknowledges he did not understand the concept of intubation, and feels she 

would never want something this aggressive, and does recognize at this point in 

time with her decline a comfort focused approach makes sense as well as 

antibiotics is acceptable.  My visit was piggybacked onto speech therapy, which 

did indeed confirm progressive dysphagia, this is related to her progressive 

dementia, and though patient can improve her caloric and fluid intake, we would 

expect ongoing decline.Patient does have a DTI on her right heel, her other 

pressure areas have improved, she does present with increased contractures since

I have seen her last, and actually finally did witness some of her 

agonal/contorting behaviors that can escalate and often only respond to 

diazepam.  Patient denies pain when asked regarding this, but does appear 

significantly distressed to though can be redirected or distracted.  Patient 

continues with poor dentition, increased swelling in her gums, and white coating

on her tongue.








Social History





- Living Situation


Living arrangement: At home


Living Situation: With family


Support System: 


Patient does have 6 children, she is estranged from 2 of them, she has had one 

child passed.  She does see the others from time to time.  She has been cared 

for by her ex- Tico for greater than 5 years, he is paid as her [employer] 

worker.  Her daughter Loli Zaragoza has both financial medical DURABLE POWER 

OF  though Tico makes day-to-day decisions regarding her care.  

Previously several months ago patient had been attempted to be placed, this was 

quite difficult as she has Medicaid, and has a history of behavioral issues.  

Current patient is much easier to manage, and will be discharged on hospice. 








Medications/Allergies





- Medications


Active Medication List: 





Active Medications





Acetaminophen (Tylenol)  650 mg PO Q4HR PRN


   PRN Reason: Pain 1 to 4


   Last Admin: 01/07/19 17:24 Dose:  650 mg


Hydrocodone Bitart/Acetaminophen (Norco 5/325)  1 tab PO Q4HR PRN


   PRN Reason: Pain 5 to 7


   Last Admin: 01/07/19 09:32 Dose:  1 tab


Buspirone HCl (Buspar)  10 mg PO TID [facility]


   Last Admin: 01/07/19 14:08 Dose:  10 mg


Chlorhexidine Gluconate (Peridex)  15 ml PO BID [facility]


   Last Admin: 01/07/19 09:30 Dose:  15 ml


Diazepam (Valium)  5 mg PO Q6H PRN


   PRN Reason: Anxiety


Enoxaparin Sodium (Lovenox)  40 mg SUBQ DAILY [facility]


   Last Admin: 01/07/19 09:30 Dose:  40 mg


Famotidine (Pepcid)  20 mg PO BID [facility]


   Last Admin: 01/07/19 09:31 Dose:  20 mg


Ceftriaxone Sodium 1 gm/ (Sodium Chloride)  100 mls @ 200 mls/hr IV Q24H [facility]


   Last Infusion: 01/06/19 20:46 Dose:  Infused


Sodium Chloride (Normal Saline 0.9%)  1,000 mls @ 83.3 mls/hr IV .Q12H1M [facility]


   Last Admin: 01/07/19 16:38 Dose:  83.3 mls/hr


Megestrol Acetate (Megace)  800 mg PO DAILY [facility]


   Last Admin: 01/07/19 09:31 Dose:  800 mg


Mineral Oil (Cavilon)  1 applic TOP PRN PRN


   PRN Reason: Skin Care


   Last Admin: 01/07/19 05:28 Dose:  1 applic


Mirtazapine (Remeron)  7.5 mg PO QPM [facility]


   Last Admin: 01/06/19 20:09 Dose:  7.5 mg


Ondansetron HCl (Zofran Inj)  4 mg IVP Q6HR PRN


   PRN Reason: Nausea / Vomiting


Ondansetron HCl (Zofran Odt)  4 mg TL Q6HR PRN


   PRN Reason: Nausea / Vomiting


Polyethylene Glycol (Miralax)  17 gm PO DAILY [facility]


   Last Admin: 01/07/19 09:31 Dose:  17 gm


Quetiapine Fumarate (Seroquel)  50 mg PO BID [facility]


   Last Admin: 01/07/19 09:31 Dose:  50 mg


Sodium Chloride (Normal Saline Flush 0.9%)  10 ml IVP PRN PRN


   PRN Reason: AS NEEDED PER PROVIDER ORDERS


Sodium Chloride (Normal Saline Flush 0.9%)  10 ml IVP 0100,0900,1700 [facility]


   Last Admin: 01/07/19 16:38 Dose:  Not Given


Trazodone HCl (Desyrel)  100 mg PO QPM [facility]


   Last Admin: 01/06/19 20:08 Dose:  100 mg





                                        





Buspirone HCl 10 mg PO TID 08/15/17 


Quetiapine Fumarate [Seroquel] 100 mg PO BID 08/16/17 


diazePAM [Diazepam] 5 mg PO Q8HR PRN 08/16/17 


Trazodone HCl 100 mg PO QPM 01/05/19 











- Allergies


Allergies/Adverse Reactions: 


                                    Allergies











Allergy/AdvReac Type Severity Reaction Status Date / Time


 


No Known Drug Allergies Allergy   Verified 08/16/17 07:46














Review of Systems





- Constitutional


Constitutional: reports: Fatigue, Weight loss (upper and lower extremity 

wasting)





- Eyes


Eyes: reports: Vision loss





- Ears, Nose & Throat


Ears, Nose & Throat: reports: Dental decay, Other (tongue with white coating)





- Gastrointestinal


Gastrointestinal: reports: Constipation (history of constipation), Other 

(increase difficulty with intake and eating about 2 weeks; previously modified 

diet)





- Genitourinary


Genitourinary: reports: Incontinence (has female suction catheter; may want to 

consider rivas placement for caregiving ease)





- Musculoskeletal


Musculoskeletal: reports: Stiffness, Muscle weakness, Other (bedbound)





- Integumentary


Integumentary: reports: Dryness, Other (recent development of pressure areas/ 

DTI right heel)





- Neurological


Neurological: reports: General weakness, Memory problems, Slurred speech





- Psychiatric


Psychiatric: reports: Anxiety, Delusions, Hallucinations





- Endocrine


Endocrine: reports: Intolerance to heat (patient often likes it cool; has fan on

 at home)





- Hematologic/Lymphatic


Hematologic/Lymphatic: denies: Recurrent infections (patient has not had any 

infections for almost 2 years)





- All Other Systems


All Other Systems: reports: Other (limited ROS)





Physical Exam





- Vital Signs


Vital Signs: 





                                Vital Signs x48h











  Temp Pulse Pulse Resp BP Pulse Ox


 


 01/07/19 16:00  37.4 C   107 H  18  128/68  95


 


 01/07/19 13:19  36.1 C L  105 H   18   96














- Physical Exam


General Appearance: positive: Mild distress, Anxious, Lethargic


Eyes Bilateral: positive: Normal inspection


ENT: positive: Other (gums with swelling; broken teeth)


Neck: positive: No JVD, Trachea midline


Cardiovascular: positive: Regular rate & rhythm


Respiratory: positive: No respiratory distress, Breath sounds nml, Diminished in

 bases


Abdomen: positive: Non-tender, Soft


Skin: positive: Dryness, Pressure wound (1.5 x 2.0 calloused area with blackened

 center; no redness or erythema surrounding)


Extremities: positive: No pedal edema


Neurologic/Psychiatric: positive: Disoriented to person, Disoriented to place, 

Disoriented to time, Weakness, Slurred/abnml speech, Flat affect





Palliative Care





- POLST


Patient has POLST: Yes


POLST Status: DNR, Comfort Measures (Reverted to old POLST; comfort 

measures/DNAR; no medical nutrition; and determine the use of antibiotics with 

comfort as the goal)


Pain: Pain improved, Location (appears to have discomfort with repositioning; 

unclear if startled or painful)


Drowsiness/Sedation: None


Anxiety: Moderate (4-6)


Constipation: Yes, Unmanaged


Performance Status: 


Patient has been bedbound long-term, has had decline in function over the last 

couple years, is unable to participate in turning or repositioning herself at 

this point in time and is dependent for feeding.








- Palliative Care


Discussion: 


Long discussion with caregiver and ex- Tico Zaragoza.  Does understand the 

severity of her illness, was fairly traumatized by her rapid changes in skin 

breakdown.  Worried about able to meet her care needs, concerned about her 

"flush rotting off".  Reassured given the attention and focus on her care and 

care needs with the hospice team, can prevent further trauma as well as address 

appropriately.  Patient does have poor nutritional status, and though this adds 

to risk for further skin breakdown, there is still mattresses/positioning and 

ways to address this. Reviewed goals of care, is conflicted as far as knowing 

Winsome would do better at home, but concerned about being able to meet her 

care needs, plus possibly placing her here from the hospital.  We did discuss in

 the context of hospice, there is , and if unable to meet care 

needs even with support of hospice, they can assist with placement.  

Anticipatory guidance was given with patient to have expected ongoing decline, 

currently has been "perked up" with antibiotics, fluids, and nursing care.  At 

the end of the conversation, decision was made to discharge with hospice care on

 Wednesday with available opening. 





1545: Did speak with Loli Zaragoza cell number [phone] and work 

[phone]. Father and she had spoken, regarding updates and concerns.  She is

 in agreement to revert back to previous [facility] which she had signed on February of 2016, With DNA R and comfort measures, no medical new nutrition as well as 

determine the use or limitation of antibiotics with the goal for comfort.  At 

this point in time do want her treated for her UTI, in agreement for transition 

to hospice.  Loli has had experience with working with hospice before, and 

does understand the intent is for comfort and to allow natural death at home.  

She is available by phone and is planning to come up on Wednesday so would be 

available to sign paperwork for hospice.  Questions were answered and concerns 

addressed, is aware of mother's decline and expressing appropriate sadness and 

grief








Results





- Lab Results


Lab results reviewed: Yes


Fish Bones: 


                                 01/07/19 05:13





                                 01/07/19 05:13


Lab and Imaging Results: 





                               Lab Results x24hrs











  01/07/19 01/07/19 01/07/19 Range/Units





  05:13 05:13 05:13 


 


WBC    6.0  (4.8-10.8)  x10^3/uL


 


RBC    3.89 L  (4.20-5.40)  10^6/uL


 


Hgb    11.3 L  (12.0-16.0)  g/dL


 


Hct    35.5 L  (37.0-47.0)  %


 


MCV    91.2  (81.0-99.0)  fL


 


MCH    29.1  (27.0-31.0)  pg


 


MCHC    32.0  (32.0-36.0)  g/dL


 


RDW    15.2 H  (12.0-15.0)  %


 


Plt Count    206  (130-450)  10^3/uL


 


MPV    7.3 L  (7.9-10.8)  fL


 


Neut # (Auto)    4.0  (1.5-6.6)  10^3/uL


 


Lymph # (Auto)    1.5  (1.5-3.5)  10^3/uL


 


Mono # (Auto)    0.3  (0.0-1.0)  10^3/uL


 


Eos # (Auto)    0.1  (0.0-0.7)  10^3/uL


 


Baso # (Auto)    0.0  (0.0-0.1)  10^3/uL


 


Absolute Nucleated RBC    0.00  x10^3/uL


 


Nucleated RBC %    0.0  /100WBC


 


Sodium   145   (135-145)  mmol/L


 


Potassium   4.0   (3.5-5.0)  mmol/L


 


Chloride   114 H   (101-111)  mmol/L


 


Carbon Dioxide   26   (21-32)  mmol/L


 


Anion Gap   5.0 L   (6-13)  


 


BUN   8   (6-20)  mg/dL


 


Creatinine   0.6   (0.4-1.0)  mg/dL


 


Estimated GFR (MDRD)   101   (>89)  


 


Glucose   94   ()  mg/dL


 


Lactic Acid  0.9    (0.5-2.2)  mmol/L


 


Calcium   8.0 L   (8.5-10.3)  mg/dL


 


Phosphorus   1.7 L   (2.5-4.6)  mg/dL


 


Magnesium   1.5 L   (1.7-2.8)  mg/dL


 


Total Bilirubin   0.2   (0.2-1.0)  mg/dL


 


AST   15   (10-42)  IU/L


 


ALT   13   (10-60)  IU/L


 


Alkaline Phosphatase   75   ()  IU/L


 


Total Protein   5.3 L   (6.7-8.2)  g/dL


 


Albumin   2.4 L   (3.2-5.5)  g/dL


 


Globulin   2.9   (2.1-4.2)  g/dL


 


Albumin/Globulin Ratio   0.8 L   (1.0-2.2)  














Impression and Recommendations





- Palliative Care


Impression: 


This is a 63-year-old woman with advanced dementia and progressive MS, presents 

with acute UTI, with DTI right heel, progressive dysphagia, malnutrition and 

failure to thrive. Palliative care to meet with family regarding goals of care, 

decision made to transition to hospice on discharge.





Recommendations/Counseling Done: 


1. DTI right heel.  Patient currently has a hospital bed from [place], will follow

 up with hospice regarding pressure relief mattress/APAP and transition to 

[facility].  Recommend when Tico present, for nursing to continue 

provide  to instruction Regarding positioning and offloading pressure points.  

May want to consider Rivas catheter placement in the context of using caregiver 

burden.





2.  Dementia with behavioral disturbances.  Patient has done well on her current

 regimen, when patient does get contorted, or escalating, redirection to be used

 first, then follow-up as patient has been responsive to Valium for assisting in

 distress.  Patient has been on it long-term, has received it 2-3 times a day 

intermittently, would recommend at least giving daily as to not precipitate candace

zodiazepine withdrawal.





3.  Advanced MS.  Patient with noted contractures, increasing dysphagia, has 

been seen by Speech Therapy. Instructions have been given to caregiver, can be 

further monitored and evaluated on transition home with hospice.





4.  Advanced care planning.  Counseling provided regarding goals of care, 

weighing benefits and burdens of home versus placement, as well as education on 

hospice benefit.  Spoke with both caregiver X has been gym and D POA daughter 

Loli, agreement to move forward with hospice referral on discharge, would 

like to go ahead and treat UTI, focus on comfort and quality of life issues.  

Both recognize patient's decline, and need for increased support in the home 

setting.  Communication update given to hospice manager and hospice medical 

director, as well as decision to hospitalist and MSW





Time Spent: 


75 minutes with greater than 50% of this done in counseling regarding goals of 

care, symptom management, anticipatory guidance regarding hospice and 

coordination of care with team members

## 2019-01-07 NOTE — PROVIDER PROGRESS NOTE
Objective





- Vital Signs/Intake & Output


Vital Signs: 


                                Vital Signs x48h











  Temp Pulse Pulse Resp BP Pulse Ox


 


 01/07/19 13:19  36.1 C L  105 H   18   96


 


 01/07/19 07:36  36.1 C L   102 H  18  110/80  99











Intake & Output: 


                                 Intake & Output











 01/04/19 01/05/19 01/06/19 01/07/19





 23:59 23:59 23:59 23:59


 


Intake Total  3755.229 0897.833 2660


 


Output Total   130 550


 


Balance  6408.392 4979.833 2110














- Lab Results


Fish Bones: 


                                 01/07/19 05:13





                                 01/07/19 05:13


Other Labs: 


                               Lab Results x24hrs











  01/07/19 01/07/19 01/07/19 Range/Units





  05:13 05:13 05:13 


 


WBC    6.0  (4.8-10.8)  x10^3/uL


 


RBC    3.89 L  (4.20-5.40)  10^6/uL


 


Hgb    11.3 L  (12.0-16.0)  g/dL


 


Hct    35.5 L  (37.0-47.0)  %


 


MCV    91.2  (81.0-99.0)  fL


 


MCH    29.1  (27.0-31.0)  pg


 


MCHC    32.0  (32.0-36.0)  g/dL


 


RDW    15.2 H  (12.0-15.0)  %


 


Plt Count    206  (130-450)  10^3/uL


 


MPV    7.3 L  (7.9-10.8)  fL


 


Neut # (Auto)    4.0  (1.5-6.6)  10^3/uL


 


Lymph # (Auto)    1.5  (1.5-3.5)  10^3/uL


 


Mono # (Auto)    0.3  (0.0-1.0)  10^3/uL


 


Eos # (Auto)    0.1  (0.0-0.7)  10^3/uL


 


Baso # (Auto)    0.0  (0.0-0.1)  10^3/uL


 


Absolute Nucleated RBC    0.00  x10^3/uL


 


Nucleated RBC %    0.0  /100WBC


 


Sodium   145   (135-145)  mmol/L


 


Potassium   4.0   (3.5-5.0)  mmol/L


 


Chloride   114 H   (101-111)  mmol/L


 


Carbon Dioxide   26   (21-32)  mmol/L


 


Anion Gap   5.0 L   (6-13)  


 


BUN   8   (6-20)  mg/dL


 


Creatinine   0.6   (0.4-1.0)  mg/dL


 


Estimated GFR (MDRD)   101   (>89)  


 


Glucose   94   ()  mg/dL


 


Lactic Acid  0.9    (0.5-2.2)  mmol/L


 


Calcium   8.0 L   (8.5-10.3)  mg/dL


 


Phosphorus   1.7 L   (2.5-4.6)  mg/dL


 


Magnesium   1.5 L   (1.7-2.8)  mg/dL


 


Total Bilirubin   0.2   (0.2-1.0)  mg/dL


 


AST   15   (10-42)  IU/L


 


ALT   13   (10-60)  IU/L


 


Alkaline Phosphatase   75   ()  IU/L


 


Total Protein   5.3 L   (6.7-8.2)  g/dL


 


Albumin   2.4 L   (3.2-5.5)  g/dL


 


Globulin   2.9   (2.1-4.2)  g/dL


 


Albumin/Globulin Ratio   0.8 L   (1.0-2.2)  














Sepsis Event Note (H)





- Evaluation


Current Stage of Sepsis: Sepsis


Possible source of Sepsis: positive: Genitourinary





- Sepsis Criteria


Sepsis Criteria: Recorded Heart Rate greater than 90 bpm, CNS: altered 

consciousness (unrelated to primary neuro pathology), Renal: urine output less 

than 0.5ml/kg/hr for 2 hours or creatinine gr





Assessment/Plan





- Problem List


(1) UTI (urinary tract infection)


Impression: 


(1) UTI (urinary tract infection)


continue antibiotics, UA reveals positive for Ecoli and sensitivity to Rocephin





(2)tachycardia


pt present HR over 100


order EKG, followup


on tele and vital monitor





(3) hypomagnesemia


Mag 1.5 today,it may be caused by malnutrition


 replacement, lab monitor, 





(4) hypophosphatemia


phosphor 1.7 today, it may be caused by malnutrition


replacement, lab monitor





(5) Failure to thrive


Conclusion/Plan: 


continue megace plus remeron as she has hx depression/anxiety





(6) Protein-calorie malnutrition, moderate


Conclusion/Plan: 


continue dietician and nutritionist  consult,


continue megace and remeron initiated. Boost/Ensure cans TID. 








(7) Dementia


 continue support, palliative and hospice referral


ST to eval for food consistencies. 





(8) Pressure ulcer


Conclusion/Plan: 


pt's skin is still intact, Wound care consult, will followup  


continue nurse care, turn over Q2H  





(9) Multiple sclerosis


Conclusion/Plan: 


advanced disease status.


support pt, consult with palliative care, and refer to hospice care





(10) Dehydration


Conclusion/Plan: 


it seems resolved, will keep pt hydration





will correct electrolytes, check labs in am








(11) Hypokalemia


resolved


Qualifiers: 


   Urinary tract infection type: site unspecified

## 2019-01-08 ENCOUNTER — HOSPITAL ENCOUNTER (OUTPATIENT)
Dept: HOSPITAL 76 - EMS | Age: 64
Discharge: HOME | End: 2019-01-08
Attending: SURGERY
Payer: MEDICAID

## 2019-01-08 VITALS — SYSTOLIC BLOOD PRESSURE: 104 MMHG | DIASTOLIC BLOOD PRESSURE: 71 MMHG

## 2019-01-08 DIAGNOSIS — Z51.5: ICD-10-CM

## 2019-01-08 DIAGNOSIS — N39.0: ICD-10-CM

## 2019-01-08 DIAGNOSIS — F03.90: ICD-10-CM

## 2019-01-08 DIAGNOSIS — L89.90: ICD-10-CM

## 2019-01-08 DIAGNOSIS — R13.10: ICD-10-CM

## 2019-01-08 DIAGNOSIS — Z74.01: Primary | ICD-10-CM

## 2019-01-08 DIAGNOSIS — G35: ICD-10-CM

## 2019-01-08 LAB
ALBUMIN DIAFP-MCNC: 2.7 G/DL (ref 3.2–5.5)
ALBUMIN/GLOB SERPL: 0.8 {RATIO} (ref 1–2.2)
ALP SERPL-CCNC: 87 IU/L (ref 42–121)
ALT SERPL W P-5'-P-CCNC: 13 IU/L (ref 10–60)
ANION GAP SERPL CALCULATED.4IONS-SCNC: 4 MMOL/L (ref 6–13)
AST SERPL W P-5'-P-CCNC: 18 IU/L (ref 10–42)
BASOPHILS NFR BLD AUTO: 0.1 10^3/UL (ref 0–0.1)
BASOPHILS NFR BLD AUTO: 1 %
BILIRUB BLD-MCNC: 0.5 MG/DL (ref 0.2–1)
BUN SERPL-MCNC: 5 MG/DL (ref 6–20)
CALCIUM UR-MCNC: 8.1 MG/DL (ref 8.5–10.3)
CHLORIDE SERPL-SCNC: 112 MMOL/L (ref 101–111)
CO2 SERPL-SCNC: 25 MMOL/L (ref 21–32)
CREAT SERPLBLD-SCNC: 0.5 MG/DL (ref 0.4–1)
EOSINOPHIL # BLD AUTO: 0.1 10^3/UL (ref 0–0.7)
EOSINOPHIL NFR BLD AUTO: 1.9 %
ERYTHROCYTE [DISTWIDTH] IN BLOOD BY AUTOMATED COUNT: 15 % (ref 12–15)
GFRSERPLBLD MDRD-ARVRAT: 125 ML/MIN/{1.73_M2} (ref 89–?)
GLOBULIN SER-MCNC: 3.4 G/DL (ref 2.1–4.2)
GLUCOSE SERPL-MCNC: 91 MG/DL (ref 70–100)
HGB UR QL STRIP: 12.5 G/DL (ref 12–16)
LYMPHOCYTES # SPEC AUTO: 1.4 10^3/UL (ref 1.5–3.5)
LYMPHOCYTES NFR BLD AUTO: 19.8 %
MAGNESIUM SERPL-MCNC: 2.1 MG/DL (ref 1.7–2.8)
MCH RBC QN AUTO: 29.5 PG (ref 27–31)
MCHC RBC AUTO-ENTMCNC: 33.4 G/DL (ref 32–36)
MCV RBC AUTO: 88.5 FL (ref 81–99)
MONOCYTES # BLD AUTO: 0.4 10^3/UL (ref 0–1)
MONOCYTES NFR BLD AUTO: 5.2 %
NEUTROPHILS # BLD AUTO: 5 10^3/UL (ref 1.5–6.6)
NEUTROPHILS # SNV AUTO: 6.9 X10^3/UL (ref 4.8–10.8)
NEUTROPHILS NFR BLD AUTO: 72.1 %
PDW BLD AUTO: 7.6 FL (ref 7.9–10.8)
PHOSPHATE BLD-MCNC: 2.2 MG/DL (ref 2.5–4.6)
PLATELET # BLD: 216 10^3/UL (ref 130–450)
PROT SPEC-MCNC: 6.1 G/DL (ref 6.7–8.2)
RBC MAR: 4.24 10^6/UL (ref 4.2–5.4)
SODIUM SERPLBLD-SCNC: 141 MMOL/L (ref 135–145)

## 2019-01-08 RX ADMIN — FAMOTIDINE SCH MG: 20 TABLET, FILM COATED ORAL at 08:49

## 2019-01-08 RX ADMIN — ENOXAPARIN SODIUM SCH MG: 100 INJECTION SUBCUTANEOUS at 08:50

## 2019-01-08 RX ADMIN — SODIUM CHLORIDE SCH MLS/HR: 9 INJECTION, SOLUTION INTRAVENOUS at 05:00

## 2019-01-08 RX ADMIN — MEGESTROL ACETATE SCH MG: 40 SUSPENSION ORAL at 08:50

## 2019-01-08 RX ADMIN — SODIUM CHLORIDE, PRESERVATIVE FREE SCH: 5 INJECTION INTRAVENOUS at 08:50

## 2019-01-08 RX ADMIN — SODIUM PHOSPHATE, DIBASIC, ANHYDROUS, POTASSIUM PHOSPHATE, MONOBASIC, AND SODIUM PHOSPHATE, MONOBASIC, MONOHYDRATE SCH MG: 852; 155; 130 TABLET, COATED ORAL at 08:50

## 2019-01-08 RX ADMIN — SODIUM PHOSPHATE, DIBASIC, ANHYDROUS, POTASSIUM PHOSPHATE, MONOBASIC, AND SODIUM PHOSPHATE, MONOBASIC, MONOHYDRATE SCH MG: 852; 155; 130 TABLET, COATED ORAL at 13:09

## 2019-01-08 RX ADMIN — POLYETHYLENE GLYCOL 3350 SCH GM: 17 POWDER, FOR SOLUTION ORAL at 08:50

## 2019-01-08 RX ADMIN — CHLORHEXIDINE GLUCONATE SCH ML: 1.2 SOLUTION ORAL at 08:50

## 2019-01-08 NOTE — DISCHARGE SUMMARY
Discharge Summary


Discharge Date: 01/08/19


Discharging Provider: SONU BATEMAN


Primary Care Provider: Dr. Jackson


Condition at Discharge: Poor


Discharge Disposition: 50 Hospice/Home DC/Xfer


Discharge Facility Name: home





- DIAGNOSES


Admission Diagnoses: 


(1) UTI (urinary tract infection)





(2) Failure to thrive





(3) Protein-calorie malnutrition, moderate





(4) Dementia





(5) Pressure ulcer





(6) Multiple sclerosis





(7) Dehydration





(8) Hypokalemia





Discharge Diagnoses with Status of Each Condition: 





(1) UTI (urinary tract infection)


stable, continue antibiotic course, followup hospice care


(2) Failure to thrive


ST consult for pt, followup hospice care


(3) Protein-calorie malnutrition, moderate


nutritionist consult with pt, followup hospice care


(4) Dementia


stable, followup hospice care


(5) Pressure ulcer


stable, followup hospice care


(6) Multiple sclerosis


followup hospice care


(7) Dehydration


resolved


(8) Hypokalemia


resolved


(9) hospice care


Palliative care consulted for pt. Pt's DPOA her daughter and her ex- 

agreed to transfer pt to hospice care. I called pt's DPOA and ex-, both 

agreed to transfer pt to home with hospice care today, all their questions were 

answered. 





- HPI


History of Present Illness: 


refer from Dr. Tate's HPI for pt as the following:


This is a 63-year-old woman with history of MS and dementia who lives with her 

ex- who is her caregiver and her DPOA.  She is been in palliative care, 

hospice was discussed at the last visit but the patient did not want to be in 

hospice and the ex- states that she was cognitive during the 

conversation.  Over the last couple of weeks she has had a progressive decline, 

not eating or drinking and she has multiple bedsores that it started all of a 

sudden.  She is been bedbound for the last 6 months. On labs patient has a UTI 

although due to her demented status unable to convey dysuria but grimaces on p

elvic palpation, is somewhat contracted, has K 3.3, BMI 20.1 and looks cachectic

and chronically ill-appearing. VSS with bp 98/69 and appears to be dry 

clinically. She wants to be FULL code per DPOA. 





- CONSULTS | PROCEDURES


Consultations: palliative care and refer to hospice care





- HOSPITAL COURSE


Hospital Course: 


pt was admitted for UTI, failure to thrive, weakness, and new onset of pressure 

ulcer. pt has hx of advanced dementia, and MS. Palliative care consulted for pt.

pt's daughter DPOA and pt's caregiver, ex- agreed to transfer pt for 

hospice care. pt is prescribed antibiotics for her UTI. pt's daughter DPOA and 

pt's caregiver, ex- agreed to transfer pt to home with hospice care 

today.








- ALLERGIES


Allergies/Adverse Reactions: 


                                    Allergies











Allergy/AdvReac Type Severity Reaction Status Date / Time


 


No Known Drug Allergies Allergy   Verified 08/16/17 07:46














- MEDICATIONS


Home Medications: 


                                Ambulatory Orders











 Medication  Instructions  Recorded  Confirmed


 


Buspirone HCl 10 mg PO TID 08/15/17 01/05/19


 


Quetiapine Fumarate [Seroquel] 100 mg PO BID 08/16/17 01/05/19


 


diazePAM [Diazepam] 5 mg PO Q8HR PRN 08/16/17 01/05/19


 


Trazodone HCl 100 mg PO QPM 01/05/19 01/05/19


 


Morphine Sulfate [Morphine Sulf 5 mg PO Q2H PRN #30 ml 01/08/19 





Oral (Roxanol)]   


 


Nitrofurantoin [Macrobid] 100 mg PO BID #10 capsule 01/08/19 














- PHYSICAL EXAM AT DISCHARGE


General Appearance: positive: No acute distress, Alert.  negative: Lethargic


Eyes Bilateral: positive: Normal inspection, PERRL, No lid inflammation, 

Conjunctivae nml


ENT: positive: ENT inspection nml, Pharynx nml, No signs of dehydration.  

negative: Purulent nasal drainage, Pharyngeal erythema, Oral lesions


Neck: positive: Nml inspection, Thyroid nml, No JVD, Trachea midline.  negative:

 Thyromegaly, Lymphadenopathy (R), Lymphadenopathy (L), Stiff neck, 

Swelling/bruising, Tracheal deviation


Respiratory: positive: Chest non-tender, No respiratory distress, Breath sounds 

nml.  negative: Wheezes, Rales, Rhonchi


Cardiovascular: positive: Regular rate & rhythm, No murmur, No gallop.  

negative: Irregularly irregular, Extrasystoles, Tachycardia, Bradycardia, JVD 

present, Systolic murmur, Diastolic murmur


Peripheral Pulses: positive: 2+


Abdomen: positive: Non-tender, No organomegaly, Nml bowel sounds, No distention.

  negative: Tenderness, Guarding, Rebound


Back: positive: Nml inspection.  negative: CVA tenderness (R), CVA tenderness 

(L)


Skin: positive: Color nml, No rash, Warm, Dry.  negative: Cyanosis, Diaphoresis,

 Pallor


Extremities: positive: Non-tender, Full ROM, Nml appearance.  negative: Calf 

tenderness, Joint swelling, Kyle's sign/cords


Neurologic/Psychiatric: positive: Weakness.  negative: Sensory loss, Facial d

ju, Slurred/abnml speech, Depressed mood/affect





- LABS


Result Diagrams: 


                                 01/08/19 08:15





                                 01/08/19 08:15





- SEPSIS


Current Stage of Sepsis: Sepsis


Possible source of Sepsis: Genitourinary


Sepsis Criteria: Recorded Heart Rate greater than 90 bpm, CNS: altered 

consciousness (unrelated to primary neuro pathology), Renal: urine output less 

than 0.5ml/kg/hr for 2 hours or creatinine gr





- FOLLOW UP


Follow Up: 


pt may followup hospice team care when she is arrival to her home.








- TIME SPENT


Time Spent in Discharge (Minutes): 60

## 2021-08-04 NOTE — DISCHARGE PLAN
Discharge Plan


Disposition: 50 Hospice/Home DC/Xfer


Condition: Poor


Prescriptions: 


Morphine Sulfate [Morphine Sulf Oral (Roxanol)] 5 mg PO Q2H PRN #30 ml


 PRN Reason: Pain/Dyspnea


Nitrofurantoin [Macrobid] 100 mg PO BID #10 capsule


Diet: Soft


Additional Instructions or Follow Up instructions: 


You may followup hospice care when you are arrival to your home.


No Smoking: If you smoke, Please STOP!  Call [phone] for help.


Follow-up with: 


Dario Jackson MD [Provider Admit Priv/Credential] -
3